# Patient Record
Sex: FEMALE | Race: WHITE | Employment: UNEMPLOYED | URBAN - METROPOLITAN AREA
[De-identification: names, ages, dates, MRNs, and addresses within clinical notes are randomized per-mention and may not be internally consistent; named-entity substitution may affect disease eponyms.]

---

## 2017-01-20 RX ORDER — SIMVASTATIN 20 MG
TABLET ORAL
Qty: 90 TABLET | Refills: 0 | Status: SHIPPED | OUTPATIENT
Start: 2017-01-20 | End: 2017-04-24 | Stop reason: SDUPTHER

## 2017-01-20 RX ORDER — LEVOTHYROXINE SODIUM 0.12 MG/1
TABLET ORAL
Qty: 90 TABLET | Refills: 0 | Status: SHIPPED | OUTPATIENT
Start: 2017-01-20 | End: 2017-04-24 | Stop reason: SDUPTHER

## 2017-04-24 DIAGNOSIS — E03.9 HYPOTHYROIDISM, UNSPECIFIED TYPE: ICD-10-CM

## 2017-04-24 DIAGNOSIS — E78.5 HYPERLIPIDEMIA, UNSPECIFIED HYPERLIPIDEMIA TYPE: Primary | ICD-10-CM

## 2017-04-24 DIAGNOSIS — I10 ESSENTIAL HYPERTENSION: ICD-10-CM

## 2017-04-27 RX ORDER — SIMVASTATIN 20 MG
TABLET ORAL
Qty: 90 TABLET | Refills: 0 | Status: SHIPPED | OUTPATIENT
Start: 2017-04-27 | End: 2017-07-30 | Stop reason: SDUPTHER

## 2017-04-27 RX ORDER — LEVOTHYROXINE SODIUM 0.12 MG/1
TABLET ORAL
Qty: 90 TABLET | Refills: 0 | Status: SHIPPED | OUTPATIENT
Start: 2017-04-27 | End: 2017-07-30 | Stop reason: SDUPTHER

## 2017-05-06 DIAGNOSIS — E78.5 HYPERLIPIDEMIA, UNSPECIFIED HYPERLIPIDEMIA TYPE: ICD-10-CM

## 2017-05-06 DIAGNOSIS — E03.9 HYPOTHYROIDISM, UNSPECIFIED TYPE: ICD-10-CM

## 2017-05-06 DIAGNOSIS — I10 ESSENTIAL HYPERTENSION: ICD-10-CM

## 2017-05-06 LAB
A/G RATIO: 1.6 (ref 1.1–2.2)
ALBUMIN SERPL-MCNC: 4.2 G/DL (ref 3.4–5)
ALP BLD-CCNC: 120 U/L (ref 40–129)
ALT SERPL-CCNC: 34 U/L (ref 10–40)
ANION GAP SERPL CALCULATED.3IONS-SCNC: 15 MMOL/L (ref 3–16)
AST SERPL-CCNC: 34 U/L (ref 15–37)
BILIRUB SERPL-MCNC: 1.3 MG/DL (ref 0–1)
BUN BLDV-MCNC: 9 MG/DL (ref 7–20)
CALCIUM SERPL-MCNC: 9.5 MG/DL (ref 8.3–10.6)
CHLORIDE BLD-SCNC: 100 MMOL/L (ref 99–110)
CHOLESTEROL, TOTAL: 180 MG/DL (ref 0–199)
CO2: 26 MMOL/L (ref 21–32)
CREAT SERPL-MCNC: <0.5 MG/DL (ref 0.6–1.1)
CREATININE URINE: 107.8 MG/DL (ref 28–259)
GFR AFRICAN AMERICAN: >60
GFR NON-AFRICAN AMERICAN: >60
GLOBULIN: 2.6 G/DL
GLUCOSE BLD-MCNC: 211 MG/DL (ref 70–99)
HDLC SERPL-MCNC: 36 MG/DL (ref 40–60)
LDL CHOLESTEROL CALCULATED: 93 MG/DL
MICROALBUMIN UR-MCNC: 3.5 MG/DL
MICROALBUMIN/CREAT UR-RTO: 32.5 MG/G (ref 0–30)
POTASSIUM SERPL-SCNC: 3.9 MMOL/L (ref 3.5–5.1)
SODIUM BLD-SCNC: 141 MMOL/L (ref 136–145)
TOTAL PROTEIN: 6.8 G/DL (ref 6.4–8.2)
TRIGL SERPL-MCNC: 256 MG/DL (ref 0–150)
TSH REFLEX: 2.88 UIU/ML (ref 0.27–4.2)
VLDLC SERPL CALC-MCNC: 51 MG/DL

## 2017-05-07 LAB
ESTIMATED AVERAGE GLUCOSE: 271.9 MG/DL
HBA1C MFR BLD: 11.1 %

## 2017-05-08 ENCOUNTER — OFFICE VISIT (OUTPATIENT)
Dept: FAMILY MEDICINE CLINIC | Age: 57
End: 2017-05-08

## 2017-05-08 VITALS
SYSTOLIC BLOOD PRESSURE: 128 MMHG | TEMPERATURE: 98.4 F | HEIGHT: 63 IN | DIASTOLIC BLOOD PRESSURE: 82 MMHG | BODY MASS INDEX: 38.98 KG/M2 | WEIGHT: 220 LBS | HEART RATE: 72 BPM

## 2017-05-08 DIAGNOSIS — Z11.59 NEED FOR HEPATITIS C SCREENING TEST: ICD-10-CM

## 2017-05-08 DIAGNOSIS — E03.9 ACQUIRED HYPOTHYROIDISM: ICD-10-CM

## 2017-05-08 DIAGNOSIS — E78.00 PURE HYPERCHOLESTEROLEMIA: ICD-10-CM

## 2017-05-08 DIAGNOSIS — Z11.4 SCREENING FOR HIV (HUMAN IMMUNODEFICIENCY VIRUS): ICD-10-CM

## 2017-05-08 DIAGNOSIS — R80.9 PROTEINURIA: ICD-10-CM

## 2017-05-08 PROCEDURE — 99214 OFFICE O/P EST MOD 30 MIN: CPT | Performed by: FAMILY MEDICINE

## 2017-05-08 ASSESSMENT — ENCOUNTER SYMPTOMS
EYE DISCHARGE: 0
BLOOD IN STOOL: 0
EYE PAIN: 0
ABDOMINAL PAIN: 0
DIARRHEA: 0
WHEEZING: 0
CONSTIPATION: 0
RHINORRHEA: 0
SINUS PRESSURE: 0
COLOR CHANGE: 0
EYE REDNESS: 0
NAUSEA: 0
CHEST TIGHTNESS: 0
COUGH: 0
VOMITING: 0
SHORTNESS OF BREATH: 0

## 2017-05-08 ASSESSMENT — PATIENT HEALTH QUESTIONNAIRE - PHQ9
SUM OF ALL RESPONSES TO PHQ QUESTIONS 1-9: 0
1. LITTLE INTEREST OR PLEASURE IN DOING THINGS: 0
SUM OF ALL RESPONSES TO PHQ9 QUESTIONS 1 & 2: 0
2. FEELING DOWN, DEPRESSED OR HOPELESS: 0

## 2017-05-10 PROBLEM — Z11.4 SCREENING FOR HIV (HUMAN IMMUNODEFICIENCY VIRUS): Status: ACTIVE | Noted: 2017-05-10

## 2017-05-10 PROBLEM — R80.9 PROTEINURIA: Status: ACTIVE | Noted: 2017-05-10

## 2017-05-10 PROBLEM — Z11.59 NEED FOR HEPATITIS C SCREENING TEST: Status: ACTIVE | Noted: 2017-05-10

## 2017-05-10 RX ORDER — LISINOPRIL 5 MG/1
5 TABLET ORAL DAILY
Qty: 90 TABLET | Refills: 1 | Status: SHIPPED | OUTPATIENT
Start: 2017-05-10 | End: 2017-05-18 | Stop reason: ALTCHOICE

## 2017-05-18 ENCOUNTER — TELEPHONE (OUTPATIENT)
Dept: FAMILY MEDICINE CLINIC | Age: 57
End: 2017-05-18

## 2017-05-18 RX ORDER — LOSARTAN POTASSIUM 25 MG/1
25 TABLET ORAL DAILY
Qty: 30 TABLET | Refills: 3 | Status: SHIPPED | OUTPATIENT
Start: 2017-05-18 | End: 2017-10-17 | Stop reason: SDUPTHER

## 2017-06-02 ENCOUNTER — TELEPHONE (OUTPATIENT)
Dept: FAMILY MEDICINE CLINIC | Age: 57
End: 2017-06-02

## 2017-06-08 ENCOUNTER — TELEPHONE (OUTPATIENT)
Dept: FAMILY MEDICINE CLINIC | Age: 57
End: 2017-06-08

## 2017-06-14 ENCOUNTER — TELEPHONE (OUTPATIENT)
Dept: FAMILY MEDICINE CLINIC | Age: 57
End: 2017-06-14

## 2017-07-10 RX ORDER — GLIPIZIDE 10 MG/1
TABLET, FILM COATED, EXTENDED RELEASE ORAL
Qty: 180 TABLET | Refills: 1 | Status: SHIPPED | OUTPATIENT
Start: 2017-07-10 | End: 2018-01-20 | Stop reason: SDUPTHER

## 2017-07-31 RX ORDER — LEVOTHYROXINE SODIUM 0.12 MG/1
TABLET ORAL
Qty: 90 TABLET | Refills: 0 | Status: SHIPPED | OUTPATIENT
Start: 2017-07-31 | End: 2017-11-03 | Stop reason: SDUPTHER

## 2017-07-31 RX ORDER — SIMVASTATIN 20 MG
TABLET ORAL
Qty: 90 TABLET | Refills: 0 | Status: SHIPPED | OUTPATIENT
Start: 2017-07-31 | End: 2017-11-03 | Stop reason: SDUPTHER

## 2017-08-01 ENCOUNTER — CARE COORDINATION (OUTPATIENT)
Dept: CARE COORDINATION | Age: 57
End: 2017-08-01

## 2017-08-17 ENCOUNTER — OFFICE VISIT (OUTPATIENT)
Dept: FAMILY MEDICINE CLINIC | Age: 57
End: 2017-08-17

## 2017-08-17 VITALS
HEIGHT: 63 IN | WEIGHT: 217.8 LBS | SYSTOLIC BLOOD PRESSURE: 108 MMHG | BODY MASS INDEX: 38.59 KG/M2 | DIASTOLIC BLOOD PRESSURE: 78 MMHG | HEART RATE: 76 BPM | TEMPERATURE: 97.7 F

## 2017-08-17 DIAGNOSIS — Z11.4 SCREENING FOR HIV (HUMAN IMMUNODEFICIENCY VIRUS): ICD-10-CM

## 2017-08-17 DIAGNOSIS — R10.13 EPIGASTRIC PAIN: ICD-10-CM

## 2017-08-17 DIAGNOSIS — Z11.59 NEED FOR HEPATITIS C SCREENING TEST: ICD-10-CM

## 2017-08-17 DIAGNOSIS — R10.13 EPIGASTRIC PAIN: Primary | ICD-10-CM

## 2017-08-17 LAB
A/G RATIO: 1.7 (ref 1.1–2.2)
ALBUMIN SERPL-MCNC: 4.2 G/DL (ref 3.4–5)
ALP BLD-CCNC: 104 U/L (ref 40–129)
ALT SERPL-CCNC: 33 U/L (ref 10–40)
AMYLASE: 54 U/L (ref 25–115)
ANION GAP SERPL CALCULATED.3IONS-SCNC: 19 MMOL/L (ref 3–16)
AST SERPL-CCNC: 31 U/L (ref 15–37)
BASOPHILS ABSOLUTE: 0 K/UL (ref 0–0.2)
BASOPHILS RELATIVE PERCENT: 0.3 %
BILIRUB SERPL-MCNC: 1.1 MG/DL (ref 0–1)
BILIRUBIN URINE: NEGATIVE
BLOOD, URINE: NEGATIVE
BUN BLDV-MCNC: 9 MG/DL (ref 7–20)
CALCIUM SERPL-MCNC: 9.4 MG/DL (ref 8.3–10.6)
CHLORIDE BLD-SCNC: 99 MMOL/L (ref 99–110)
CLARITY: CLEAR
CO2: 22 MMOL/L (ref 21–32)
COLOR: YELLOW
CREAT SERPL-MCNC: 0.5 MG/DL (ref 0.6–1.1)
EOSINOPHILS ABSOLUTE: 0.2 K/UL (ref 0–0.6)
EOSINOPHILS RELATIVE PERCENT: 2.5 %
GFR AFRICAN AMERICAN: >60
GFR NON-AFRICAN AMERICAN: >60
GLOBULIN: 2.5 G/DL
GLUCOSE BLD-MCNC: 177 MG/DL (ref 70–99)
GLUCOSE URINE: NEGATIVE MG/DL
HCT VFR BLD CALC: 39.6 % (ref 36–48)
HEMOGLOBIN: 13.4 G/DL (ref 12–16)
HEPATITIS C ANTIBODY INTERPRETATION: NORMAL
KETONES, URINE: NEGATIVE MG/DL
LEUKOCYTE ESTERASE, URINE: NEGATIVE
LIPASE: 67 U/L (ref 13–60)
LYMPHOCYTES ABSOLUTE: 3.3 K/UL (ref 1–5.1)
LYMPHOCYTES RELATIVE PERCENT: 36.2 %
MCH RBC QN AUTO: 30 PG (ref 26–34)
MCHC RBC AUTO-ENTMCNC: 33.8 G/DL (ref 31–36)
MCV RBC AUTO: 88.7 FL (ref 80–100)
MICROSCOPIC EXAMINATION: NORMAL
MONOCYTES ABSOLUTE: 0.6 K/UL (ref 0–1.3)
MONOCYTES RELATIVE PERCENT: 6.3 %
NEUTROPHILS ABSOLUTE: 5 K/UL (ref 1.7–7.7)
NEUTROPHILS RELATIVE PERCENT: 54.7 %
NITRITE, URINE: NEGATIVE
PDW BLD-RTO: 13.7 % (ref 12.4–15.4)
PH UA: 5.5
PLATELET # BLD: 213 K/UL (ref 135–450)
PMV BLD AUTO: 8.9 FL (ref 5–10.5)
POTASSIUM SERPL-SCNC: 3.9 MMOL/L (ref 3.5–5.1)
PROTEIN UA: NEGATIVE MG/DL
RBC # BLD: 4.47 M/UL (ref 4–5.2)
SODIUM BLD-SCNC: 140 MMOL/L (ref 136–145)
SPECIFIC GRAVITY UA: 1.02
TOTAL PROTEIN: 6.7 G/DL (ref 6.4–8.2)
URINE TYPE: NORMAL
UROBILINOGEN, URINE: 1 E.U./DL
WBC # BLD: 9.2 K/UL (ref 4–11)

## 2017-08-17 PROCEDURE — 99214 OFFICE O/P EST MOD 30 MIN: CPT | Performed by: FAMILY MEDICINE

## 2017-08-17 ASSESSMENT — ENCOUNTER SYMPTOMS
EYE PAIN: 0
SINUS PRESSURE: 0
BLOOD IN STOOL: 0
COUGH: 0
EYE REDNESS: 0
ABDOMINAL PAIN: 1
DIARRHEA: 0
RHINORRHEA: 0
CONSTIPATION: 0
WHEEZING: 0
SHORTNESS OF BREATH: 0
VOMITING: 0
EYE DISCHARGE: 0
CHEST TIGHTNESS: 0

## 2017-08-18 LAB
ESTIMATED AVERAGE GLUCOSE: 194.4 MG/DL
HBA1C MFR BLD: 8.4 %
HIV-1 AND HIV-2 ANTIBODIES: NORMAL

## 2017-08-21 ASSESSMENT — ENCOUNTER SYMPTOMS
COLOR CHANGE: 0
NAUSEA: 0

## 2017-08-28 ENCOUNTER — OFFICE VISIT (OUTPATIENT)
Dept: FAMILY MEDICINE CLINIC | Age: 57
End: 2017-08-28

## 2017-08-28 VITALS
HEART RATE: 76 BPM | BODY MASS INDEX: 38.48 KG/M2 | TEMPERATURE: 97.8 F | SYSTOLIC BLOOD PRESSURE: 112 MMHG | DIASTOLIC BLOOD PRESSURE: 80 MMHG | WEIGHT: 217.2 LBS | HEIGHT: 63 IN

## 2017-08-28 DIAGNOSIS — E03.4 HYPOTHYROIDISM DUE TO ACQUIRED ATROPHY OF THYROID: ICD-10-CM

## 2017-08-28 DIAGNOSIS — E78.00 PURE HYPERCHOLESTEROLEMIA: ICD-10-CM

## 2017-08-28 PROCEDURE — 99213 OFFICE O/P EST LOW 20 MIN: CPT | Performed by: FAMILY MEDICINE

## 2017-08-28 ASSESSMENT — ENCOUNTER SYMPTOMS
SHORTNESS OF BREATH: 0
DIARRHEA: 0
EYE PAIN: 0
ABDOMINAL PAIN: 0
BLOOD IN STOOL: 0
SINUS PRESSURE: 0
NAUSEA: 0
WHEEZING: 0
COUGH: 0
CHEST TIGHTNESS: 0
EYE REDNESS: 0
EYE DISCHARGE: 0
VOMITING: 0
RHINORRHEA: 0
COLOR CHANGE: 0
CONSTIPATION: 0

## 2017-10-17 RX ORDER — LOSARTAN POTASSIUM 25 MG/1
TABLET ORAL
Qty: 30 TABLET | Refills: 2 | Status: SHIPPED | OUTPATIENT
Start: 2017-10-17 | End: 2018-03-06 | Stop reason: SDUPTHER

## 2017-11-04 RX ORDER — SIMVASTATIN 20 MG
TABLET ORAL
Qty: 90 TABLET | Refills: 0 | Status: SHIPPED | OUTPATIENT
Start: 2017-11-04 | End: 2018-01-20 | Stop reason: SDUPTHER

## 2017-11-04 RX ORDER — LEVOTHYROXINE SODIUM 0.12 MG/1
TABLET ORAL
Qty: 90 TABLET | Refills: 0 | Status: SHIPPED | OUTPATIENT
Start: 2017-11-04 | End: 2018-01-20 | Stop reason: SDUPTHER

## 2018-01-18 ENCOUNTER — OFFICE VISIT (OUTPATIENT)
Dept: FAMILY MEDICINE CLINIC | Age: 58
End: 2018-01-18

## 2018-01-18 ENCOUNTER — TELEPHONE (OUTPATIENT)
Dept: FAMILY MEDICINE CLINIC | Age: 58
End: 2018-01-18

## 2018-01-18 VITALS
HEIGHT: 63 IN | OXYGEN SATURATION: 96 % | HEART RATE: 88 BPM | SYSTOLIC BLOOD PRESSURE: 136 MMHG | DIASTOLIC BLOOD PRESSURE: 80 MMHG | TEMPERATURE: 97.7 F | BODY MASS INDEX: 39.16 KG/M2 | WEIGHT: 221 LBS

## 2018-01-18 DIAGNOSIS — R35.0 URINARY FREQUENCY: ICD-10-CM

## 2018-01-18 DIAGNOSIS — R73.09 ELEVATED GLUCOSE LEVEL: ICD-10-CM

## 2018-01-18 LAB
BILIRUBIN, POC: NORMAL
BLOOD URINE, POC: NORMAL
CLARITY, POC: NORMAL
COLOR, POC: NORMAL
GLUCOSE BLD-MCNC: 228 MG/DL
GLUCOSE URINE, POC: NORMAL
HBA1C MFR BLD: 10 %
KETONES, POC: NORMAL
LEUKOCYTE EST, POC: NORMAL
NITRITE, POC: NORMAL
PH, POC: 5
PROTEIN, POC: NORMAL
SPECIFIC GRAVITY, POC: 1.03
UROBILINOGEN, POC: 0.2

## 2018-01-18 PROCEDURE — G8427 DOCREV CUR MEDS BY ELIG CLIN: HCPCS | Performed by: PHYSICIAN ASSISTANT

## 2018-01-18 PROCEDURE — 3046F HEMOGLOBIN A1C LEVEL >9.0%: CPT | Performed by: PHYSICIAN ASSISTANT

## 2018-01-18 PROCEDURE — 83036 HEMOGLOBIN GLYCOSYLATED A1C: CPT | Performed by: PHYSICIAN ASSISTANT

## 2018-01-18 PROCEDURE — 81002 URINALYSIS NONAUTO W/O SCOPE: CPT | Performed by: PHYSICIAN ASSISTANT

## 2018-01-18 PROCEDURE — 99214 OFFICE O/P EST MOD 30 MIN: CPT | Performed by: PHYSICIAN ASSISTANT

## 2018-01-18 PROCEDURE — 3017F COLORECTAL CA SCREEN DOC REV: CPT | Performed by: PHYSICIAN ASSISTANT

## 2018-01-18 PROCEDURE — G8417 CALC BMI ABV UP PARAM F/U: HCPCS | Performed by: PHYSICIAN ASSISTANT

## 2018-01-18 PROCEDURE — 1036F TOBACCO NON-USER: CPT | Performed by: PHYSICIAN ASSISTANT

## 2018-01-18 PROCEDURE — 82962 GLUCOSE BLOOD TEST: CPT | Performed by: PHYSICIAN ASSISTANT

## 2018-01-18 PROCEDURE — 3014F SCREEN MAMMO DOC REV: CPT | Performed by: PHYSICIAN ASSISTANT

## 2018-01-18 PROCEDURE — G8484 FLU IMMUNIZE NO ADMIN: HCPCS | Performed by: PHYSICIAN ASSISTANT

## 2018-01-18 NOTE — TELEPHONE ENCOUNTER
I see she saw the physician assistant today at my new office as I was not in. Is she continuing to see me in [facility]?  I see she has appt with New doctor beginning feb- Dr. Zaheer Figueroa on feb 5th  If so then cancel her feb 15th with me in [facility]

## 2018-01-18 NOTE — PROGRESS NOTES
Esomeprazole Magnesium (NEXIUM PO) Take by mouth daily       No current facility-administered medications for this visit. PHYSICAL EXAM     Vitals:    01/18/18 1110   BP: 136/80   Site: Left Arm   Position: Sitting   Cuff Size: Medium Adult   Pulse: 88   Temp: 97.7 °F (36.5 °C)   TempSrc: Temporal   SpO2: 96%   Weight: 221 lb (100.2 kg)   Height: 5' 3\" (1.6 m)     APPEARANCE: Pleasant, friendly, well-nourished. No acute distress. HEENT: Normocephalic, atraumatic. EOMI,PERRLA. Conjunctiva pink and moist.  Sclera white. Ears: External ears uniform. Hearing intact. Nose: No septal deviation. Mouth: Oral mucosa moist. Throat is without erythema, exudates, edema. NECK: No lymphadenopathy or masses. HEART: Reg rate and rhythm. No murmurs, rubs, or gallops. LUNGS: Clear to auscultation. No wheezes, rales, or rhonchi. ABDOMEN:  Soft, non-tender. Normal active bowel sounds. No guarding. No hepatosplenomegaly  MUSCULOSKELETOL:  No new deformity. No clubbing, cyanosis or edema. NEUROLOGIC: Normal gross and sensory exam.  SKIN: Warm, dry, normal turgor. Cap refill <3secs. No rashes, petechiae, purpura. ADDITIONAL STUDIES     Pertinent laboratory results and/or imaging reviewed. Orders Placed This Encounter   Procedures    Ambulatory referral to Diabetic Education=at [facility] ( close to home)    POCT glycosylated hemoglobin (Hb A1C)=10.0    POCT Glucose=228    POCT Urinalysis no Micro= SG>1.030 with glucosuria, and trace ketones. Negative for UTI. IMPRESSION/ PLAN  1. Uncontrolled type 2 diabetes mellitus without complication, without long-term current use of insulin (HCC)    2. Elevated glucose level    3. Urinary frequency      Uncontrolled diabetes therefore will add Victoza so while strongly encouraging dietitian assistance. She was instructed on use of Victoza and states her father is on it and has seen him use it. Discussed results obtained today.  Diagnosis and instructions discussed in detail. ( Total time spent with Jovan Card was 60 minutes. Greater than 30  Minutes I.e. Greater than 50% of this time was spent counseling and coordinating the care of this patient.)        Patient Instructions   Add Victoza to your medication regimen. Begin taking blood sugars daily and record. Appointment made for diabetic diet training to be done at [facility].  Increase water intake. Avoid all sweets. Decrease fatty foods. Follow-up in one month with Dr. Junaid Silva. Fasting labs will be performed at follow-up. Orders Placed This Encounter   Medications    Liraglutide (VICTOZA) 18 MG/3ML SOPN SC injection     Sig: Inject 1.2 mg into the skin daily Start at 0.6mg SC qd x 1 wk, then 1.2mg SC qd; Max 1.8mg SC q day.      Dispense:  6 mL     Refill:  0

## 2018-01-22 RX ORDER — GLIPIZIDE 10 MG/1
TABLET, FILM COATED, EXTENDED RELEASE ORAL
Qty: 180 TABLET | Refills: 0 | Status: SHIPPED | OUTPATIENT
Start: 2018-01-22 | End: 2018-05-03 | Stop reason: SDUPTHER

## 2018-01-22 RX ORDER — SIMVASTATIN 20 MG
TABLET ORAL
Qty: 90 TABLET | Refills: 0 | Status: SHIPPED | OUTPATIENT
Start: 2018-01-22 | End: 2018-05-03 | Stop reason: SDUPTHER

## 2018-01-22 RX ORDER — LEVOTHYROXINE SODIUM 0.12 MG/1
TABLET ORAL
Qty: 90 TABLET | Refills: 0 | Status: SHIPPED | OUTPATIENT
Start: 2018-01-22 | End: 2018-05-03 | Stop reason: SDUPTHER

## 2018-01-22 RX ORDER — SITAGLIPTIN 100 MG/1
TABLET, FILM COATED ORAL
Qty: 90 TABLET | Refills: 0 | Status: SHIPPED | OUTPATIENT
Start: 2018-01-22 | End: 2018-07-31

## 2018-02-05 ENCOUNTER — OFFICE VISIT (OUTPATIENT)
Dept: FAMILY MEDICINE CLINIC | Age: 58
End: 2018-02-05

## 2018-02-05 VITALS
BODY MASS INDEX: 38.09 KG/M2 | OXYGEN SATURATION: 95 % | WEIGHT: 215 LBS | HEIGHT: 63 IN | RESPIRATION RATE: 16 BRPM | DIASTOLIC BLOOD PRESSURE: 70 MMHG | SYSTOLIC BLOOD PRESSURE: 112 MMHG | HEART RATE: 95 BPM

## 2018-02-05 DIAGNOSIS — I72.8 SPLENIC ARTERY ANEURYSM (HCC): ICD-10-CM

## 2018-02-05 DIAGNOSIS — E78.00 PURE HYPERCHOLESTEROLEMIA: ICD-10-CM

## 2018-02-05 DIAGNOSIS — R80.9 PROTEINURIA, UNSPECIFIED TYPE: ICD-10-CM

## 2018-02-05 DIAGNOSIS — E03.4 HYPOTHYROIDISM DUE TO ACQUIRED ATROPHY OF THYROID: ICD-10-CM

## 2018-02-05 PROBLEM — Z11.59 NEED FOR HEPATITIS C SCREENING TEST: Status: RESOLVED | Noted: 2017-05-10 | Resolved: 2018-02-05

## 2018-02-05 PROBLEM — Z11.4 SCREENING FOR HIV (HUMAN IMMUNODEFICIENCY VIRUS): Status: RESOLVED | Noted: 2017-05-10 | Resolved: 2018-02-05

## 2018-02-05 PROCEDURE — 3046F HEMOGLOBIN A1C LEVEL >9.0%: CPT | Performed by: INTERNAL MEDICINE

## 2018-02-05 PROCEDURE — 3017F COLORECTAL CA SCREEN DOC REV: CPT | Performed by: INTERNAL MEDICINE

## 2018-02-05 PROCEDURE — G8427 DOCREV CUR MEDS BY ELIG CLIN: HCPCS | Performed by: INTERNAL MEDICINE

## 2018-02-05 PROCEDURE — G8484 FLU IMMUNIZE NO ADMIN: HCPCS | Performed by: INTERNAL MEDICINE

## 2018-02-05 PROCEDURE — 1036F TOBACCO NON-USER: CPT | Performed by: INTERNAL MEDICINE

## 2018-02-05 PROCEDURE — 99213 OFFICE O/P EST LOW 20 MIN: CPT | Performed by: INTERNAL MEDICINE

## 2018-02-05 PROCEDURE — G8417 CALC BMI ABV UP PARAM F/U: HCPCS | Performed by: INTERNAL MEDICINE

## 2018-02-05 PROCEDURE — 3014F SCREEN MAMMO DOC REV: CPT | Performed by: INTERNAL MEDICINE

## 2018-02-05 NOTE — PROGRESS NOTES
HPI: Estrella Encarnacion presents to establish care and show sugars. Chronic health issues include tobacco cessation, diabetes, splenic aneurysm, obesity. DM2 x 10 years. a1c 10 . Elevated triglycerides. + protein in urine. No neuropathy. No current exercise. Positive tinea. Started victoza with improvement of sugars and 6 pound weight loss. Is going to start exercising again. Eats quite a bit of fruit. Aware vegetables her better. Does not go barefoot. Denies any neuropathy or retinopathy. Positive proteinuria. Taking metformin, glipizide, Januvia as well. Has not scheduled with clinical pharmacist. Some issues with cost of medication. Declines flu vaccine        no abnormal pap. No STD concerns. No fh ovarian or breast cancer in family pap  [place]. utd with mammograms. Tubal pregnancy. No domestic violence. . CT of the abdomen with splenic aneurysm. Recheck due in 10 of 2018. Colonoscopy due May 2018. PMH:    Cholecystectomy    Childbirth    Hx tubal pregnancy laparotomy     SH:  daughter 12. . Tobacco cessation 60 packyears discontinued 20 yr ago. FH: 2 sisters 1 brother    + DM,MGF suicide, dementia in 66's     - colon, breast, ovarina cancer. Review of systems: Remote concussion. Cataract. No chronic sinus symptoms. Denies any wheezing pneumonias or abnormal chest x-rays. Denies emphysema. No chest pain palpitations or syncope. No breast masses. Denies any GE reflux or bloody stools. No kidney stones or recurrent bladder infections. Rare knee pain. Tinea of the toes.         12 point ROS reviewed and negative other than mentioned above  Allergies   Allergen Reactions    Lisinopril      Cough/scratchy throat       Outpatient Prescriptions Marked as Taking for the 18 encounter (Office Visit) with Robin Mortensen MD   Medication Sig Dispense Refill    JANUVIA 100 MG tablet TAKE ONE TABLET BY MOUTH EVERY MORNING 90 tablet 0   

## 2018-02-05 NOTE — PATIENT INSTRUCTIONS
buy without a prescription (such as corn removers) can be harmful. · Always get early treatment for foot problems. A minor irritation can lead to a major problem if not properly cared for early. When should you call for help? Call your doctor now or seek immediate medical care if:  ? · You have a foot sore, an ulcer or break in the skin that is not healing after 4 days, bleeding corns or calluses, or an ingrown toenail. ? · You have blue or black areas, which can mean bruising or blood flow problems. ? · You have peeling skin or tiny blisters between your toes or cracking or oozing of the skin. ? · You have a fever for more than 24 hours and a foot sore. ? · You have new numbness or tingling in your feet that does not go away after you move your feet or change positions. ? · You have unexplained or unusual swelling of the foot or ankle. ? Watch closely for changes in your health, and be sure to contact your doctor if:  ? · You cannot do proper foot care. Where can you learn more? Go to https://ClickablepeInfracommerce.TapFame. org and sign in to your [employer]ub account. Enter A739 in the [employer] box to learn more about \"Diabetes Foot Health: Care Instructions. \"     If you do not have an account, please click on the \"Sign Up Now\" link. Current as of: March 13, 2017  Content Version: 11.5  © 8984-3599 [employer]. Care instructions adapted under license by [facility] ([facility]). If you have questions about a medical condition or this instruction, always ask your healthcare professional. [place] [address] any warranty or liability for your use of this information. Patient Education        Learning About Diabetes Food Guidelines  Your Care Instructions    Meal planning is important to manage diabetes. It helps keep your blood sugar at a target level (which you set with your doctor). You don't have to eat special foods.  You can eat what your family eats, including diabetes medicines. Follow-up care is a key part of your treatment and safety. Be sure to make and go to all appointments, and call your doctor if you are having problems. It's also a good idea to know your test results and keep a list of the medicines you take. Where can you learn more? Go to https://chpepiceweb.iPrism Global. org and sign in to your [employer] account. Enter P900 in the [employer] box to learn more about \"Learning About Diabetes Food Guidelines. \"     If you do not have an account, please click on the \"Sign Up Now\" link. Current as of: March 13, 2017  Content Version: 11.5  © 7564-9731 Healthwise, Incorporated. Care instructions adapted under license by [facility] ([facility]). If you have questions about a medical condition or this instruction, always ask your healthcare professional. [address] any warranty or liability for your use of this information.

## 2018-02-09 ENCOUNTER — OFFICE VISIT (OUTPATIENT)
Dept: FAMILY MEDICINE CLINIC | Age: 58
End: 2018-02-09

## 2018-02-09 VITALS
RESPIRATION RATE: 16 BRPM | WEIGHT: 212 LBS | DIASTOLIC BLOOD PRESSURE: 84 MMHG | HEIGHT: 63 IN | HEART RATE: 68 BPM | SYSTOLIC BLOOD PRESSURE: 125 MMHG | BODY MASS INDEX: 37.56 KG/M2

## 2018-02-09 DIAGNOSIS — R51.9 ACUTE NONINTRACTABLE HEADACHE, UNSPECIFIED HEADACHE TYPE: Primary | ICD-10-CM

## 2018-02-09 PROCEDURE — 99213 OFFICE O/P EST LOW 20 MIN: CPT | Performed by: FAMILY MEDICINE

## 2018-02-09 PROCEDURE — G8484 FLU IMMUNIZE NO ADMIN: HCPCS | Performed by: FAMILY MEDICINE

## 2018-02-09 PROCEDURE — 3017F COLORECTAL CA SCREEN DOC REV: CPT | Performed by: FAMILY MEDICINE

## 2018-02-09 PROCEDURE — 1036F TOBACCO NON-USER: CPT | Performed by: FAMILY MEDICINE

## 2018-02-09 PROCEDURE — G8427 DOCREV CUR MEDS BY ELIG CLIN: HCPCS | Performed by: FAMILY MEDICINE

## 2018-02-09 PROCEDURE — G8417 CALC BMI ABV UP PARAM F/U: HCPCS | Performed by: FAMILY MEDICINE

## 2018-02-09 PROCEDURE — 3014F SCREEN MAMMO DOC REV: CPT | Performed by: FAMILY MEDICINE

## 2018-02-09 RX ORDER — AZITHROMYCIN 250 MG/1
TABLET, FILM COATED ORAL
Qty: 1 PACKET | Refills: 0 | Status: SHIPPED | OUTPATIENT
Start: 2018-02-09 | End: 2018-03-14

## 2018-03-06 ENCOUNTER — TELEPHONE (OUTPATIENT)
Dept: FAMILY MEDICINE CLINIC | Age: 58
End: 2018-03-06

## 2018-03-06 RX ORDER — LOSARTAN POTASSIUM 25 MG/1
TABLET ORAL
Qty: 990 TABLET | Refills: 3 | Status: SHIPPED | OUTPATIENT
Start: 2018-03-06 | End: 2019-01-14 | Stop reason: SDUPTHER

## 2018-03-15 ENCOUNTER — OFFICE VISIT (OUTPATIENT)
Dept: FAMILY MEDICINE CLINIC | Age: 58
End: 2018-03-15

## 2018-03-15 VITALS
OXYGEN SATURATION: 97 % | DIASTOLIC BLOOD PRESSURE: 62 MMHG | WEIGHT: 213.25 LBS | HEIGHT: 63 IN | BODY MASS INDEX: 37.79 KG/M2 | HEART RATE: 92 BPM | SYSTOLIC BLOOD PRESSURE: 118 MMHG

## 2018-03-15 DIAGNOSIS — R80.9 PROTEINURIA, UNSPECIFIED TYPE: ICD-10-CM

## 2018-03-15 DIAGNOSIS — H81.09 MENIERE'S DISEASE, UNSPECIFIED LATERALITY: ICD-10-CM

## 2018-03-15 DIAGNOSIS — E78.00 PURE HYPERCHOLESTEROLEMIA: ICD-10-CM

## 2018-03-15 DIAGNOSIS — I72.8 SPLENIC ARTERY ANEURYSM (HCC): ICD-10-CM

## 2018-03-15 DIAGNOSIS — E03.4 HYPOTHYROIDISM DUE TO ACQUIRED ATROPHY OF THYROID: ICD-10-CM

## 2018-03-15 LAB — HBA1C MFR BLD: 7.6 %

## 2018-03-15 PROCEDURE — G8417 CALC BMI ABV UP PARAM F/U: HCPCS | Performed by: INTERNAL MEDICINE

## 2018-03-15 PROCEDURE — 3014F SCREEN MAMMO DOC REV: CPT | Performed by: INTERNAL MEDICINE

## 2018-03-15 PROCEDURE — 3017F COLORECTAL CA SCREEN DOC REV: CPT | Performed by: INTERNAL MEDICINE

## 2018-03-15 PROCEDURE — 1036F TOBACCO NON-USER: CPT | Performed by: INTERNAL MEDICINE

## 2018-03-15 PROCEDURE — 83036 HEMOGLOBIN GLYCOSYLATED A1C: CPT | Performed by: INTERNAL MEDICINE

## 2018-03-15 PROCEDURE — G8427 DOCREV CUR MEDS BY ELIG CLIN: HCPCS | Performed by: INTERNAL MEDICINE

## 2018-03-15 PROCEDURE — 3045F PR MOST RECENT HEMOGLOBIN A1C LEVEL 7.0-9.0%: CPT | Performed by: INTERNAL MEDICINE

## 2018-03-15 PROCEDURE — 99213 OFFICE O/P EST LOW 20 MIN: CPT | Performed by: INTERNAL MEDICINE

## 2018-03-15 PROCEDURE — G8484 FLU IMMUNIZE NO ADMIN: HCPCS | Performed by: INTERNAL MEDICINE

## 2018-03-15 NOTE — PATIENT INSTRUCTIONS
good, quick way to make sure that you have a balanced meal. It also helps you spread carbs throughout the day. ¨ Divide your plate by types of foods. Put non-starchy vegetables on half the plate, meat or other protein food on one-quarter of the plate, and a grain or starchy vegetable in the final quarter of the plate. To this you can add a small piece of fruit and 1 cup of milk or yogurt, depending on how many carbs you are supposed to eat at a meal.  · Try to eat about the same amount of carbs at each meal. Do not \"save up\" your daily allowance of carbs to eat at one meal.  · Proteins have very little or no carbs per serving. Examples of proteins are beef, chicken, turkey, fish, eggs, tofu, cheese, cottage cheese, and peanut butter. A serving size of meat is 3 ounces, which is about the size of a deck of cards. Examples of meat substitute serving sizes (equal to 1 ounce of meat) are 1/4 cup of cottage cheese, 1 egg, 1 tablespoon of peanut butter, and ½ cup of tofu. How can you eat out and still eat healthy? · Learn to estimate the serving sizes of foods that have carbohydrate. If you measure food at home, it will be easier to estimate the amount in a serving of restaurant food. · If the meal you order has too much carbohydrate (such as potatoes, corn, or baked beans), ask to have a low-carbohydrate food instead. Ask for a salad or green vegetables. · If you use insulin, check your blood sugar before and after eating out to help you plan how much to eat in the future. · If you eat more carbohydrate at a meal than you had planned, take a walk or do other exercise. This will help lower your blood sugar. What else should you know? · Limit saturated fat, such as the fat from meat and dairy products. This is a healthy choice because people who have diabetes are at higher risk of heart disease. So choose lean cuts of meat and nonfat or low-fat dairy products.  Use olive or canola oil instead of butter or shortening when cooking. · Don't skip meals. Your blood sugar may drop too low if you skip meals and take insulin or certain medicines for diabetes. · Check with your doctor before you drink alcohol. Alcohol can cause your blood sugar to drop too low. Alcohol can also cause a bad reaction if you take certain diabetes medicines. Follow-up care is a key part of your treatment and safety. Be sure to make and go to all appointments, and call your doctor if you are having problems. It's also a good idea to know your test results and keep a list of the medicines you take. Where can you learn more? Go to https://GridCOM Technologiespepiceweb.iBuildApp. org and sign in to your Healthcare IT account. Enter V919 in the [employer] box to learn more about \"Learning About Diabetes Food Guidelines. \"     If you do not have an account, please click on the \"Sign Up Now\" link. Current as of: March 13, 2017  Content Version: 11.5  © 6969-4130 Healthwise, [employer]. Care instructions adapted under license by [facility] ([facility]). If you have questions about a medical condition or this instruction, always ask your healthcare professional. [place] [address] any warranty or liability for your use of this information. NICE JOB!     See you late May fasting  Continue exercise  I cannot order 1.8 and will check to see what the problem with this is    Look on line for discounts

## 2018-03-15 NOTE — PROGRESS NOTES
tablet TAKE ONE TABLET BY MOUTH TWICE A  tablet 0    simvastatin (ZOCOR) 20 MG tablet TAKE ONE TABLET BY MOUTH EVERY NIGHT AT BEDTIME 90 tablet 0    metFORMIN (GLUCOPHAGE) 1000 MG tablet TAKE 1 TABLET BY MOUTH TWICE A DAY WITH MEALS 180 tablet 1    vitamin D (CHOLECALCIFEROL) 1000 UNIT TABS tablet Take 1,000 Units by mouth daily      Esomeprazole Magnesium (NEXIUM PO) Take by mouth daily               Past Medical History:   Diagnosis Date    GERD (gastroesophageal reflux disease)     Hyperlipidemia     Hypothyroidism     Type II or unspecified type diabetes mellitus without mention of complication, not stated as uncontrolled        Past Surgical History:   Procedure Laterality Date     SECTION      x 4    CHOLECYSTECTOMY           Social History     Social History    Marital status:      Spouse name: N/A    Number of children: N/A    Years of education: N/A     Occupational History    Not on file. Social History Main Topics    Smoking status: Former Smoker     Years: 60.00    Smokeless tobacco: Former User    Alcohol use Yes      Comment: rare    Drug use: No    Sexual activity: Not on file     Other Topics Concern    Not on file     Social History Narrative    No narrative on file       Family History   Problem Relation Age of Onset    Diabetes Father     High Blood Pressure Father     Heart Attack Paternal Grandfather            Review of Systems          Objective     /62 (Site: Left Arm, Position: Sitting, Cuff Size: Large Adult)   Pulse 92   Ht 5' 3\" (1.6 m)   Wt 213 lb 4 oz (96.7 kg)   LMP 2013   SpO2 97%   BMI 37.78 kg/m²     @LASTSAO2(3)@    Wt Readings from Last 3 Encounters:   03/15/18 213 lb 4 oz (96.7 kg)   18 212 lb (96.2 kg)   18 215 lb (97.5 kg)       Physical Exam     NAD alert and cooperative  HEENT:Moist mucous membranes. Marginal dentition. Good upstroke of the carotids. Positive alopecia. Lungs are clear.  Good I:E

## 2018-04-13 ENCOUNTER — TELEPHONE (OUTPATIENT)
Dept: FAMILY MEDICINE CLINIC | Age: 58
End: 2018-04-13

## 2018-05-01 ENCOUNTER — TELEPHONE (OUTPATIENT)
Dept: FAMILY MEDICINE CLINIC | Age: 58
End: 2018-05-01

## 2018-05-03 DIAGNOSIS — E78.00 PURE HYPERCHOLESTEROLEMIA: Primary | ICD-10-CM

## 2018-05-03 DIAGNOSIS — E03.4 HYPOTHYROIDISM DUE TO ACQUIRED ATROPHY OF THYROID: ICD-10-CM

## 2018-05-03 RX ORDER — LEVOTHYROXINE SODIUM 0.12 MG/1
TABLET ORAL
Qty: 90 TABLET | Refills: 1 | Status: SHIPPED | OUTPATIENT
Start: 2018-05-03 | End: 2018-10-28 | Stop reason: SDUPTHER

## 2018-05-03 RX ORDER — SIMVASTATIN 20 MG
TABLET ORAL
Qty: 90 TABLET | Refills: 1 | Status: SHIPPED | OUTPATIENT
Start: 2018-05-03 | End: 2018-10-28 | Stop reason: SDUPTHER

## 2018-05-03 RX ORDER — GLIPIZIDE 10 MG/1
TABLET, FILM COATED, EXTENDED RELEASE ORAL
Qty: 180 TABLET | Refills: 0 | Status: SHIPPED | OUTPATIENT
Start: 2018-05-03 | End: 2018-08-01 | Stop reason: SDUPTHER

## 2018-05-03 NOTE — LETTER
Bill Guti[address] [id number]  Phone: [phone]  Fax: [phone]    Chikis Cadena, [employer]        May 8, 2018    [address]  [id number] [address]      Dear Yolanda Perez:    Gregg Rendon are due for a 3 month diabetic follow up in June. Please call to schedule this at your soonest convenience to avoid any interruption in your medication. If you have any questions or concerns, please don't hesitate to call.     Sincerely,        Chikis Cadena MA

## 2018-07-31 ENCOUNTER — OFFICE VISIT (OUTPATIENT)
Dept: FAMILY MEDICINE CLINIC | Age: 58
End: 2018-07-31

## 2018-07-31 VITALS
HEART RATE: 90 BPM | DIASTOLIC BLOOD PRESSURE: 60 MMHG | SYSTOLIC BLOOD PRESSURE: 110 MMHG | WEIGHT: 214 LBS | BODY MASS INDEX: 37.91 KG/M2 | OXYGEN SATURATION: 97 %

## 2018-07-31 DIAGNOSIS — R07.9 CHEST PAIN, UNSPECIFIED TYPE: ICD-10-CM

## 2018-07-31 DIAGNOSIS — M25.511 BILATERAL SHOULDER PAIN, UNSPECIFIED CHRONICITY: ICD-10-CM

## 2018-07-31 DIAGNOSIS — E03.4 HYPOTHYROIDISM DUE TO ACQUIRED ATROPHY OF THYROID: ICD-10-CM

## 2018-07-31 DIAGNOSIS — M25.512 BILATERAL SHOULDER PAIN, UNSPECIFIED CHRONICITY: ICD-10-CM

## 2018-07-31 LAB
ANION GAP SERPL CALCULATED.3IONS-SCNC: 16 MMOL/L (ref 3–16)
BUN BLDV-MCNC: 7 MG/DL (ref 7–20)
CALCIUM SERPL-MCNC: 9.4 MG/DL (ref 8.3–10.6)
CHLORIDE BLD-SCNC: 101 MMOL/L (ref 99–110)
CO2: 25 MMOL/L (ref 21–32)
CREAT SERPL-MCNC: <0.5 MG/DL (ref 0.6–1.1)
CREATININE URINE: 77 MG/DL (ref 28–259)
GFR AFRICAN AMERICAN: >60
GFR NON-AFRICAN AMERICAN: >60
GLUCOSE BLD-MCNC: 135 MG/DL (ref 70–99)
HBA1C MFR BLD: 7.5 %
MICROALBUMIN UR-MCNC: <1.2 MG/DL
MICROALBUMIN/CREAT UR-RTO: NORMAL MG/G (ref 0–30)
POTASSIUM SERPL-SCNC: 3.8 MMOL/L (ref 3.5–5.1)
SODIUM BLD-SCNC: 142 MMOL/L (ref 136–145)
TSH REFLEX FT4: 0.34 UIU/ML (ref 0.27–4.2)

## 2018-07-31 PROCEDURE — 3017F COLORECTAL CA SCREEN DOC REV: CPT | Performed by: INTERNAL MEDICINE

## 2018-07-31 PROCEDURE — 99214 OFFICE O/P EST MOD 30 MIN: CPT | Performed by: INTERNAL MEDICINE

## 2018-07-31 PROCEDURE — G8427 DOCREV CUR MEDS BY ELIG CLIN: HCPCS | Performed by: INTERNAL MEDICINE

## 2018-07-31 PROCEDURE — 3014F SCREEN MAMMO DOC REV: CPT | Performed by: INTERNAL MEDICINE

## 2018-07-31 PROCEDURE — 1036F TOBACCO NON-USER: CPT | Performed by: INTERNAL MEDICINE

## 2018-07-31 PROCEDURE — G8417 CALC BMI ABV UP PARAM F/U: HCPCS | Performed by: INTERNAL MEDICINE

## 2018-07-31 PROCEDURE — 2022F DILAT RTA XM EVC RTNOPTHY: CPT | Performed by: INTERNAL MEDICINE

## 2018-07-31 PROCEDURE — 36415 COLL VENOUS BLD VENIPUNCTURE: CPT | Performed by: INTERNAL MEDICINE

## 2018-07-31 PROCEDURE — 83036 HEMOGLOBIN GLYCOSYLATED A1C: CPT | Performed by: INTERNAL MEDICINE

## 2018-07-31 PROCEDURE — 93000 ELECTROCARDIOGRAM COMPLETE: CPT | Performed by: INTERNAL MEDICINE

## 2018-07-31 PROCEDURE — 3045F PR MOST RECENT HEMOGLOBIN A1C LEVEL 7.0-9.0%: CPT | Performed by: INTERNAL MEDICINE

## 2018-07-31 RX ORDER — DIPHENHYDRAMINE HYDROCHLORIDE 25 MG/1
TABLET ORAL
COMMUNITY

## 2018-07-31 RX ORDER — NAPROXEN 500 MG/1
TABLET ORAL
Qty: 60 TABLET | Refills: 0 | Status: SHIPPED | OUTPATIENT
Start: 2018-07-31 | End: 2018-09-23 | Stop reason: SDUPTHER

## 2018-07-31 ASSESSMENT — PATIENT HEALTH QUESTIONNAIRE - PHQ9
SUM OF ALL RESPONSES TO PHQ QUESTIONS 1-9: 0
2. FEELING DOWN, DEPRESSED OR HOPELESS: 0
SUM OF ALL RESPONSES TO PHQ9 QUESTIONS 1 & 2: 0
1. LITTLE INTEREST OR PLEASURE IN DOING THINGS: 0

## 2018-07-31 NOTE — PATIENT INSTRUCTIONS
Patient Education        Rotator Cuff: Exercises  Your Care Instructions  Here are some examples of typical rehabilitation exercises for your condition. Start each exercise slowly. Ease off the exercise if you start to have pain. Your doctor or physical therapist will tell you when you can start these exercises and which ones will work best for you. How to do the exercises  Pendulum swing    If you have pain in your back, do not do this exercise. 1. Hold on to a table or the back of a chair with your good arm. Then bend forward a little and let your sore arm hang straight down. This exercise does not use the arm muscles. Rather, use your legs and your hips to create movement that makes your arm swing freely. 2. Use the movement from your hips and legs to guide the slightly swinging arm back and forth like a pendulum (or elephant trunk). Then guide it in circles that start small (about the size of a dinner plate). Make the circles a bit larger each day, as your pain allows. 3. Do this exercise for 5 minutes, 5 to 7 times each day. 4. As you have less pain, try bending over a little farther to do this exercise. This will increase the amount of movement at your shoulder. Posterior stretching exercise    1. Hold the elbow of your injured arm with your other hand. 2. Use your hand to pull your injured arm gently up and across your body. You will feel a gentle stretch across the back of your injured shoulder. 3. Hold for at least 15 to 30 seconds. Then slowly lower your arm. 4. Repeat 2 to 4 times. Up-the-back stretch    Your doctor or physical therapist may want you to wait to do this stretch until you have regained most of your range of motion and strength. You can do this stretch in different ways. Hold any of these stretches for at least 15 to 30 seconds. Repeat them 2 to 4 times. 1. Put your hand in your back pocket. Let it rest there to stretch your shoulder.   2. With your other hand, hold your injured arm (palm outward) behind your back by the wrist. Pull your arm up gently to stretch your shoulder. 3. Next, put a towel over your other shoulder. Put the hand of your injured arm behind your back. Now hold the back end of the towel. With the other hand, hold the front end of the towel in front of your body. Pull gently on the front end of the towel. This will bring your hand farther up your back to stretch your shoulder. Overhead stretch    1. Standing about an arm's length away, grasp onto a solid surface. You could use a countertop, a doorknob, or the back of a sturdy chair. 2. With your knees slightly bent, bend forward with your arms straight. Lower your upper body, and let your shoulders stretch. 3. As your shoulders are able to stretch farther, you may need to take a step or two backward. 4. Hold for at least 15 to 30 seconds. Then stand up and relax. If you had stepped back during your stretch, step forward so you can keep your hands on the solid surface. 5. Repeat 2 to 4 times. Shoulder flexion (lying down)    To make a wand for this exercise, use a piece of PVC pipe or a broom handle with the broom removed. Make the wand about a foot wider than your shoulders. 1. Lie on your back, holding a wand with both hands. Your palms should face down as you hold the wand. 2. Keeping your elbows straight, slowly raise your arms over your head. Raise them until you feel a stretch in your shoulders, upper back, and chest.  3. Hold for 15 to 30 seconds. 4. Repeat 2 to 4 times. Shoulder rotation (lying down)    To make a wand for this exercise, use a piece of PVC pipe or a broom handle with the broom removed. Make the wand about a foot wider than your shoulders. 1. Lie on your back. Hold a wand with both hands with your elbows bent and palms up. 2. Keep your elbows close to your body, and move the wand across your body toward the sore arm. 3. Hold for 8 to 12 seconds. 4. Repeat 2 to 4 times.   Wall climbing (to the side)    Avoid any movement that is straight to your side, and be careful not to arch your back. Your arm should stay about 30 degrees to the front of your side. 1. Stand with your side to a wall so that your fingers can just touch it at an angle about 30 degrees toward the front of your body. 2. Walk the fingers of your injured arm up the wall as high as pain permits. Try not to shrug your shoulder up toward your ear as you move your arm up. 3. Hold that position for a count of at least 15 to 20.  4. Walk your fingers back down to the starting position. 5. Repeat at least 2 to 4 times. Try to reach higher each time. Wall climbing (to the front)    During this stretching exercise, be careful not to arch your back. 1. Face a wall, and stand so your fingers can just touch it. 2. Keeping your shoulder down, walk the fingers of your injured arm up the wall as high as pain permits. (Don't shrug your shoulder up toward your ear.)  3. Hold your arm in that position for at least 15 to 30 seconds. 4. Slowly walk your fingers back down to where you started. 5. Repeat at least 2 to 4 times. Try to reach higher each time. Shoulder blade squeeze    1. Stand with your arms at your sides, and squeeze your shoulder blades together. Do not raise your shoulders up as you squeeze. 2. Hold 6 seconds. 3. Repeat 8 to 12 times. Scapular exercise: Arm reach    1. Lie flat on your back. This exercise is a very slight motion that starts with your arms raised (elbows straight, arms straight). 2. From this position, reach higher toward the gricelda or ceiling. Keep your elbows straight. All motion should be from your shoulder blade only. 3. Relax your arms back to where you started. 4. Repeat 8 to 12 times. Arm raise to the side    During this strengthening exercise, your arm should stay about 30 degrees to the front of your side. 1. Slowly raise your injured arm to the side, with your thumb facing up.  Raise your arm 60 couch, then to a sturdy chair, and finally to the floor. Scapular exercise: Retraction    For this exercise, you will need elastic exercise material, such as surgical tubing or Thera-Band. 1. Put the band around a solid object at about waist level. (A bedpost will work well.) Each hand should hold an end of the band. 2. With your elbows at your sides and bent to 90 degrees, pull the band back. Your shoulder blades should move toward each other. Then move your arms back where you started. 3. Repeat 8 to 12 times. 4. If you have good range of motion in your shoulders, try this exercise with your arms lifted out to the sides. Keep your elbows at a 90-degree angle. Raise the elastic band up to about shoulder level. Pull the band back to move your shoulder blades toward each other. Then move your arms back where you started. Internal rotator strengthening exercise    1. Start by tying a piece of elastic exercise material to a doorknob. You can use surgical tubing or Thera-Band. 2. Stand or sit with your shoulder relaxed and your elbow bent 90 degrees. Your upper arm should rest comfortably against your side. Squeeze a rolled towel between your elbow and your body for comfort. This will help keep your arm at your side. 3. Hold one end of the elastic band in the hand of the painful arm. 4. Slowly rotate your forearm toward your body until it touches your belly. Slowly move it back to where you started. 5. Keep your elbow and upper arm firmly tucked against the towel roll or at your side. 6. Repeat 8 to 12 times. External rotator strengthening exercise    1. Start by tying a piece of elastic exercise material to a doorknob. You can use surgical tubing or Thera-Band. (You may also hold one end of the band in each hand.)  2. Stand or sit with your shoulder relaxed and your elbow bent 90 degrees. Your upper arm should rest comfortably against your side.  Squeeze a rolled towel between your elbow and your body for more about \"Starting a Weight Loss Plan: Care Instructions. \"     If you do not have an account, please click on the \"Sign Up Now\" link. Current as of: October 9, 2017  Content Version: 11.6  © 4981-9479 [employer]. Care instructions adapted under license by [facility] ([facility]). If you have questions about a medical condition or this instruction, always ask your healthcare professional. [address] any warranty or liability for your use of this information. Patient Education        Learning About Low-Carbohydrate Diets for Weight Loss  What is a low-carbohydrate diet? Low-carb diets avoid foods that are high in carbohydrate. These high-carb foods include pasta, bread, rice, cereal, fruits, and starchy vegetables. Instead, these diets usually have you eat foods that are high in fat and protein. Many people lose weight quickly on a low-carb diet. But the early weight loss is water. People on this diet often gain the weight back after they start eating carbs again. Not all diet plans are safe or work well. A lot of the evidence shows that low-carb diets aren't healthy. That's because these diets often don't include healthy foods like fruits and vegetables. Losing weight safely means balancing protein, fat, and carbs with every meal and snack. And low-carb diets don't always provide the vitamins, minerals, and fiber you need. If you have a serious medical condition, talk to your doctor before you try any diet. These conditions include kidney disease, heart disease, type 2 diabetes, high cholesterol, and high blood pressure. If you are pregnant, it may not be safe for your baby if you are on a low-carb diet. How can you lose weight safely? You might have heard that a diet plan helped another person lose weight. But that doesn't mean that it will work for you. It is very hard to stay on a diet that includes lots of big changes in your eating habits.  If you want to get to a healthy weight and stay there, making healthy lifestyle changes will often work better than dieting. These steps can help. · Make a plan for change. Work with your doctor to create a plan that is right for you. · See a dietitian. He or she can show you how to make healthy changes in your eating habits. · Manage stress. If you have a lot of stress in your life, it can be hard to focus on making healthy changes to your daily habits. · Track your food and activity. You are likely to do better at losing weight if you keep track of what you eat and what you do. Follow-up care is a key part of your treatment and safety. Be sure to make and go to all appointments, and call your doctor if you are having problems. It's also a good idea to know your test results and keep a list of the medicines you take. Where can you learn more? Go to https://[employer]armapenoryewbrigido.SA Ignite. org and sign in to your [employer] account. Enter A121 in the [employer] box to learn more about \"Learning About Low-Carbohydrate Diets for Weight Loss. \"     If you do not have an account, please click on the \"Sign Up Now\" link. Current as of: May 12, 2017  Content Version: 11.6  © 6805-1224 [employer]. Care instructions adapted under license by [facility] ([facility]). If you have questions about a medical condition or this instruction, always ask your healthcare professional. [place] [address] any warranty or liability for your use of this information. Shoulder stretching left daily  Walk 30 min daily  May use ice heat and naprosyn twice daily.   Stop naprosyn if stomach upset  Check sugars in evening also  Follow up 1 month for weight, shoulder and sugar report

## 2018-07-31 NOTE — PROGRESS NOTES
HPI: Olivier Gordon presents for shoulder pain    Right shoulder strain 6 weeks ago. Continued to have discomfort. Good range of motion however painful. No paresthesias or weakness. Over the last 2 days she is noted intermittent tint squeezing sensation in the upper left arm. Is not exertional in nature. Lasts several seconds in duration. Can occur 2 or 3 times an hour. Does not wake her from sleep. Using no medications for this. Symptoms seem to be worsening. EM 2. A1c of 7.6 after getting back on medicine. No retinopathy. Positive microalbumin. Uses ARB. Positive statin, glipizide, metformin, thick toes, and has discontinued Januvia secondary to cost. No paresthesias. Not following diet strictly or exercising. Is due repeat laboratories today. Not fasting.        no abnormal pap. No STD concerns. No fh ovarian or breast cancer in family, pap  [place]. utd with mammograms. Tubal pregnancy.      CT of the abdomen with splenic aneurysm. Recheck due in 10 of 2018.      Colonoscopy due May 2018.         PMH:    Cholecystectomy  Arzella Bridegroom    Hx tubal pregnancy laparotomy      SH:  daughter 12.  . Tobacco cessation 60 packyears discontinued 20 yr ago. Rare alcohol. Rare sex. Dyspareunia. No drugs     FH: 2 sisters 1 brother    + DM,MGF suicide, dementia in 66's     - colon, breast, ovarina cancer.     Review of systems: Remote concussion. Cataract. No chronic sinus symptoms. Denies any wheezing pneumonias or abnormal chest x-rays. Denies emphysema. No chest pain palpitations or syncope. No breast masses. Denies any GE reflux or bloody stools. No kidney stones or recurrent bladder infections. Rare knee pain. Tinea of the toes.  Dyspareunia    Constitutional, ent, CV, respiratory, GI, , joint, skin, allergic and psychiatric ROS negative except for above    Allergies   Allergen Reactions    Lisinopril      Cough/scratchy throat       Outpatient Prescriptions Marked as Taking for the 18 encounter (Office Visit) with Ruddy Milton MD   Medication Sig Dispense Refill    Biotin (BIOTIN 5000) 5 MG CAPS Take by mouth      glipiZIDE (GLUCOTROL XL) 10 MG extended release tablet TAKE ONE TABLET BY MOUTH TWICE A  tablet 0    levothyroxine (SYNTHROID) 125 MCG tablet TAKE ONE TABLET BY MOUTH DAILY 90 tablet 1    simvastatin (ZOCOR) 20 MG tablet TAKE ONE TABLET BY MOUTH EVERY NIGHT AT BEDTIME 90 tablet 1    metFORMIN (GLUCOPHAGE) 1000 MG tablet TAKE 1 TABLET BY MOUTH TWICE A DAY WITH MEALS 180 tablet 1    Liraglutide (VICTOZA) 18 MG/3ML SOPN SC injection 1.8mg SC q day. 4 pen 5    losartan (COZAAR) 25 MG tablet TAKE ONE TABLET BY MOUTH DAILY 990 tablet 3    JANUVIA 100 MG tablet TAKE ONE TABLET BY MOUTH EVERY MORNING 90 tablet 0    vitamin D (CHOLECALCIFEROL) 1000 UNIT TABS tablet Take 1,000 Units by mouth daily      Esomeprazole Magnesium (NEXIUM PO) Take by mouth daily               Past Medical History:   Diagnosis Date    GERD (gastroesophageal reflux disease)     Hyperlipidemia     Hypothyroidism     Type II or unspecified type diabetes mellitus without mention of complication, not stated as uncontrolled        Past Surgical History:   Procedure Laterality Date     SECTION      x 4    CHOLECYSTECTOMY           Social History     Social History    Marital status:      Spouse name: N/A    Number of children: N/A    Years of education: N/A     Occupational History    Not on file.      Social History Main Topics    Smoking status: Former Smoker     Years: 60.00    Smokeless tobacco: Former User    Alcohol use Yes      Comment: rare    Drug use: No    Sexual activity: Not on file     Other Topics Concern    Not on file     Social History Narrative    No narrative on file       Family History   Problem Relation Age of Onset    Diabetes Father     High Blood Pressure Father     Heart Attack Paternal Grandfather            Review of

## 2018-08-01 RX ORDER — GLIPIZIDE 10 MG/1
TABLET, FILM COATED, EXTENDED RELEASE ORAL
Qty: 180 TABLET | Refills: 0 | Status: SHIPPED | OUTPATIENT
Start: 2018-08-01 | End: 2018-10-28 | Stop reason: SDUPTHER

## 2018-09-10 ENCOUNTER — OFFICE VISIT (OUTPATIENT)
Dept: FAMILY MEDICINE CLINIC | Age: 58
End: 2018-09-10

## 2018-09-10 VITALS
HEART RATE: 84 BPM | RESPIRATION RATE: 12 BRPM | OXYGEN SATURATION: 94 % | WEIGHT: 212 LBS | HEIGHT: 63 IN | BODY MASS INDEX: 37.56 KG/M2 | SYSTOLIC BLOOD PRESSURE: 106 MMHG | DIASTOLIC BLOOD PRESSURE: 70 MMHG

## 2018-09-10 DIAGNOSIS — Z23 NEED FOR IMMUNIZATION AGAINST COMBINATION OF INFECTIOUS DISEASES: ICD-10-CM

## 2018-09-10 DIAGNOSIS — E78.00 PURE HYPERCHOLESTEROLEMIA: ICD-10-CM

## 2018-09-10 DIAGNOSIS — H81.09 MENIERE'S DISEASE, UNSPECIFIED LATERALITY: Primary | ICD-10-CM

## 2018-09-10 LAB
CHOLESTEROL, FASTING: 162 MG/DL (ref 0–199)
HDLC SERPL-MCNC: 38 MG/DL (ref 40–60)
LDL CHOLESTEROL CALCULATED: 82 MG/DL
TRIGLYCERIDE, FASTING: 212 MG/DL (ref 0–150)
VLDLC SERPL CALC-MCNC: 42 MG/DL

## 2018-09-10 PROCEDURE — G8417 CALC BMI ABV UP PARAM F/U: HCPCS | Performed by: INTERNAL MEDICINE

## 2018-09-10 PROCEDURE — 3017F COLORECTAL CA SCREEN DOC REV: CPT | Performed by: INTERNAL MEDICINE

## 2018-09-10 PROCEDURE — 99213 OFFICE O/P EST LOW 20 MIN: CPT | Performed by: INTERNAL MEDICINE

## 2018-09-10 PROCEDURE — 3045F PR MOST RECENT HEMOGLOBIN A1C LEVEL 7.0-9.0%: CPT | Performed by: INTERNAL MEDICINE

## 2018-09-10 PROCEDURE — 90471 IMMUNIZATION ADMIN: CPT | Performed by: INTERNAL MEDICINE

## 2018-09-10 PROCEDURE — G8427 DOCREV CUR MEDS BY ELIG CLIN: HCPCS | Performed by: INTERNAL MEDICINE

## 2018-09-10 PROCEDURE — 2022F DILAT RTA XM EVC RTNOPTHY: CPT | Performed by: INTERNAL MEDICINE

## 2018-09-10 PROCEDURE — 3014F SCREEN MAMMO DOC REV: CPT | Performed by: INTERNAL MEDICINE

## 2018-09-10 PROCEDURE — 36415 COLL VENOUS BLD VENIPUNCTURE: CPT | Performed by: INTERNAL MEDICINE

## 2018-09-10 PROCEDURE — 90682 RIV4 VACC RECOMBINANT DNA IM: CPT | Performed by: INTERNAL MEDICINE

## 2018-09-10 PROCEDURE — 1036F TOBACCO NON-USER: CPT | Performed by: INTERNAL MEDICINE

## 2018-09-10 RX ORDER — MECLIZINE HYDROCHLORIDE 25 MG/1
25 TABLET ORAL 3 TIMES DAILY PRN
Qty: 20 TABLET | Refills: 0 | Status: SHIPPED | OUTPATIENT
Start: 2018-09-10 | End: 2019-01-14

## 2018-09-10 NOTE — PROGRESS NOTES
HPI: Chastity Jean presents for general symptoms. Diagnosis Ménière's in evaluation over decades ago. Has seen ENT who agrees with diagnosis. Onset yesterday of' her typical symptoms. Eyes any roaring in the ears. Denies any fevers, chills, dietary indiscretion. No trauma. No headache or confusion. No falls. States she generally improves with some type of medicine within 24-48 hours. Is aware of a low-salt diet. Looking through her records shows that she was on Antivert in the past. She is not interested in following back up with ENT. States her hearing is diminished.     DM 2. A1c of 7.5  2018 No retinopathy. Positive microalbumin. Uses ARB. Positive statin, glipizide, metformin, , and has discontinued Januvia secondary to cost. No paresthesias. Not following diet strictly or exercising        no abnormal pap. No STD concerns. No fh ovarian or breast cancer in family, pap  [place]. utd with mammograms. Tubal pregnancy.      CT of the abdomen with splenic aneurysm. Recheck due in 10 of 2018.      Colonoscopy due May 2018.         PMH:    Cholecystectomy  Marrion Fresh    Hx tubal pregnancy laparotomy      SH:  daughter 12.  . Tobacco cessation 60 packyears discontinued 20 yr ago. Rare alcohol. Rare sex. Dyspareunia. No drugs     FH: 2 sisters 1 brother    + DM,MGF suicide, dementia in 66's     - colon, breast, ovarina cancer.     Review of systems: Remote concussion. Cataract. No chronic sinus symptoms. Denies any wheezing pneumonias or abnormal chest x-rays. Denies emphysema. No chest pain palpitations or syncope. No breast masses. Denies any GE reflux or bloody stools. No kidney stones or recurrent bladder infections. Rare knee pain. Tinea of the toes.  Dyspareunia     Constitutional, ent, CV, respiratory, GI, , joint, skin, allergic and psychiatric ROS negative except for above    Allergies   Allergen Reactions    Lisinopril      Cough/scratchy throat Outpatient Prescriptions Marked as Taking for the 9/10/18 encounter (Office Visit) with Anna Quintana MD   Medication Sig Dispense Refill    glipiZIDE (GLUCOTROL XL) 10 MG extended release tablet TAKE ONE TABLET BY MOUTH TWICE A  tablet 0    Biotin (BIOTIN 5000) 5 MG CAPS Take by mouth      naproxen (NAPROSYN) 500 MG tablet 1 po bid with meals. Stop if hearburn. No refill 60 tablet 0    levothyroxine (SYNTHROID) 125 MCG tablet TAKE ONE TABLET BY MOUTH DAILY 90 tablet 1    simvastatin (ZOCOR) 20 MG tablet TAKE ONE TABLET BY MOUTH EVERY NIGHT AT BEDTIME 90 tablet 1    metFORMIN (GLUCOPHAGE) 1000 MG tablet TAKE 1 TABLET BY MOUTH TWICE A DAY WITH MEALS 180 tablet 1    Liraglutide (VICTOZA) 18 MG/3ML SOPN SC injection 1.8mg SC q day. 4 pen 5    losartan (COZAAR) 25 MG tablet TAKE ONE TABLET BY MOUTH DAILY 990 tablet 3    vitamin D (CHOLECALCIFEROL) 1000 UNIT TABS tablet Take 1,000 Units by mouth daily      Esomeprazole Magnesium (NEXIUM PO) Take by mouth daily               Past Medical History:   Diagnosis Date    GERD (gastroesophageal reflux disease)     Hyperlipidemia     Hypothyroidism     Type II or unspecified type diabetes mellitus without mention of complication, not stated as uncontrolled        Past Surgical History:   Procedure Laterality Date     SECTION      x 4    CHOLECYSTECTOMY           Social History     Social History    Marital status:      Spouse name: N/A    Number of children: N/A    Years of education: N/A     Occupational History    Not on file.      Social History Main Topics    Smoking status: Former Smoker     Packs/day: 3.00     Years: 60.00     Types: Cigarettes     Quit date: 9/10/1993    Smokeless tobacco: Former User    Alcohol use Yes      Comment: rare    Drug use: No    Sexual activity: Yes     Partners: Male      Comment: rare      Other Topics Concern    Not on file     Social History Narrative    No narrative on file

## 2018-09-10 NOTE — PATIENT INSTRUCTIONS
can make you aware of any improvements. Include the date, the time you spent exercising, how often your eyes were open or closed, and how you felt during each exercise. ¨ Walk 5 steps and then stop abruptly. Wait for any dizzy feeling to go away, and do it again. Repeat until you have walked about 50 feet. Walking exercises for vertigo may improve your balance and your symptoms of vertigo. You may want to have someone next to you while you do these exercises in case you lose your balance. ¨ For another walking exercise, walk 5 steps, turn, and walk back. Wait for any dizziness to go away. Repeat 5 more times. ¨ For harder exercise, walk and turn your head to the right or left with every other step. Try to walk about 50 feet. Repeat the exercise while moving your head up and down. Then repeat while moving your head up to one side and down to the other side, and then switching. ¨ Prepare a list that shows the distance you walked, how often you walked, and how you felt while you were walking. · Make sure your home is safe for those times when you have an attack of vertigo. ¨ Get rid of throw rugs, and use nonskid mats. ¨ Use grab bars near the bathtub and toilet. ¨ Use night-lights. ¨ Keep floors dry to prevent slipping. ¨ Store items you use often on low shelves so you do not have to climb or reach high. If you have to climb, use a step stool with handrails. ¨ Keep driveways, sidewalks, and other walkways clear of things that might cause you to trip. · There are other steps you can take to stay safe. ¨ Avoid driving or working at heights. ¨ Wear shoes with low heels and nonslip soles. ¨ Keep your shoes tied. ¨ Alert family and friends to your condition. ¨ Know whether medicines you take can affect your sense of balance. When should you call for help? Call 911 anytime you think you may need emergency care.  For example, call if:    · You passed out (lost consciousness).     · You have symptoms of a stroke. These may include:  ¨ Sudden numbness, tingling, weakness, or loss of movement in your face, arm, or leg, especially on only one side of your body. ¨ Sudden vision changes. ¨ Sudden trouble speaking. ¨ Sudden confusion or trouble understanding simple statements. ¨ Sudden problems with walking or balance. ¨ A sudden, severe headache that is different from past headaches.    Call your doctor now or seek immediate medical care if:    · You have new or worse dizziness.     · You notice changes in your hearing.    Watch closely for changes in your health, and be sure to contact your doctor if:    · You have new or worse nausea or vomiting.     · Your vertigo gets worse.     · You do not get better as expected. Where can you learn more? Go to https://Ord[employer].paraBebes.com. org and sign in to your [employer] account. Enter N187 in the [employer] box to learn more about \"Ménière's Disease: Care Instructions. \"     If you do not have an account, please click on the \"Sign Up Now\" link. Current as of: May 12, 2017  Content Version: 11.7  © 6463-1593 [employer]. Care instructions adapted under license by [facility] ([facility]). If you have questions about a medical condition or this instruction, always ask your healthcare professional. [address]  any warranty or liability for your use of this information.

## 2018-09-24 RX ORDER — NAPROXEN 500 MG/1
TABLET ORAL
Qty: 60 TABLET | Refills: 0 | Status: SHIPPED | OUTPATIENT
Start: 2018-09-24 | End: 2019-01-14

## 2018-10-28 DIAGNOSIS — E03.4 HYPOTHYROIDISM DUE TO ACQUIRED ATROPHY OF THYROID: ICD-10-CM

## 2018-10-28 DIAGNOSIS — E78.00 PURE HYPERCHOLESTEROLEMIA: ICD-10-CM

## 2018-10-29 RX ORDER — LEVOTHYROXINE SODIUM 0.12 MG/1
TABLET ORAL
Qty: 90 TABLET | Refills: 0 | Status: SHIPPED | OUTPATIENT
Start: 2018-10-29 | End: 2019-01-14 | Stop reason: SDUPTHER

## 2018-10-29 RX ORDER — SIMVASTATIN 20 MG
TABLET ORAL
Qty: 90 TABLET | Refills: 0 | Status: SHIPPED | OUTPATIENT
Start: 2018-10-29 | End: 2019-01-14 | Stop reason: SDUPTHER

## 2018-10-29 RX ORDER — GLIPIZIDE 10 MG/1
TABLET, FILM COATED, EXTENDED RELEASE ORAL
Qty: 180 TABLET | Refills: 0 | Status: SHIPPED | OUTPATIENT
Start: 2018-10-29 | End: 2019-01-14 | Stop reason: SDUPTHER

## 2019-01-14 ENCOUNTER — OFFICE VISIT (OUTPATIENT)
Dept: FAMILY MEDICINE CLINIC | Age: 59
End: 2019-01-14
Payer: COMMERCIAL

## 2019-01-14 VITALS
WEIGHT: 215 LBS | DIASTOLIC BLOOD PRESSURE: 82 MMHG | SYSTOLIC BLOOD PRESSURE: 125 MMHG | HEIGHT: 63 IN | OXYGEN SATURATION: 96 % | HEART RATE: 82 BPM | RESPIRATION RATE: 16 BRPM | BODY MASS INDEX: 38.09 KG/M2

## 2019-01-14 DIAGNOSIS — R11.10 REGURGITATION OF FOOD: ICD-10-CM

## 2019-01-14 DIAGNOSIS — E03.4 HYPOTHYROIDISM DUE TO ACQUIRED ATROPHY OF THYROID: ICD-10-CM

## 2019-01-14 DIAGNOSIS — E11.65 UNCONTROLLED TYPE 2 DIABETES MELLITUS WITH HYPERGLYCEMIA (HCC): Primary | ICD-10-CM

## 2019-01-14 DIAGNOSIS — E78.00 PURE HYPERCHOLESTEROLEMIA: ICD-10-CM

## 2019-01-14 DIAGNOSIS — H81.09 MENIERE'S DISEASE, UNSPECIFIED LATERALITY: ICD-10-CM

## 2019-01-14 DIAGNOSIS — Z86.010 HISTORY OF COLON POLYPS: ICD-10-CM

## 2019-01-14 DIAGNOSIS — I72.8 SPLENIC ARTERY ANEURYSM (HCC): ICD-10-CM

## 2019-01-14 PROBLEM — Z86.0100 HISTORY OF COLON POLYPS: Status: ACTIVE | Noted: 2019-01-14

## 2019-01-14 LAB — HBA1C MFR BLD: 7.6 %

## 2019-01-14 PROCEDURE — 99213 OFFICE O/P EST LOW 20 MIN: CPT | Performed by: INTERNAL MEDICINE

## 2019-01-14 PROCEDURE — 83036 HEMOGLOBIN GLYCOSYLATED A1C: CPT | Performed by: INTERNAL MEDICINE

## 2019-01-14 RX ORDER — LOSARTAN POTASSIUM 25 MG/1
TABLET ORAL
Qty: 90 TABLET | Refills: 1 | Status: SHIPPED | OUTPATIENT
Start: 2019-01-14 | End: 2019-10-30

## 2019-01-14 RX ORDER — SIMVASTATIN 20 MG
TABLET ORAL
Qty: 90 TABLET | Refills: 1 | Status: SHIPPED | OUTPATIENT
Start: 2019-01-14 | End: 2019-08-02 | Stop reason: SDUPTHER

## 2019-01-14 RX ORDER — GLIPIZIDE 10 MG/1
TABLET, FILM COATED, EXTENDED RELEASE ORAL
Qty: 180 TABLET | Refills: 1 | Status: SHIPPED | OUTPATIENT
Start: 2019-01-14 | End: 2019-10-23 | Stop reason: SDUPTHER

## 2019-01-14 RX ORDER — LEVOTHYROXINE SODIUM 0.12 MG/1
TABLET ORAL
Qty: 90 TABLET | Refills: 1 | Status: SHIPPED | OUTPATIENT
Start: 2019-01-14 | End: 2019-08-02 | Stop reason: SDUPTHER

## 2019-02-01 ENCOUNTER — INITIAL CONSULT (OUTPATIENT)
Dept: SURGERY | Age: 59
End: 2019-02-01
Payer: COMMERCIAL

## 2019-02-01 VITALS
HEART RATE: 85 BPM | WEIGHT: 215 LBS | DIASTOLIC BLOOD PRESSURE: 79 MMHG | SYSTOLIC BLOOD PRESSURE: 117 MMHG | HEIGHT: 63 IN | BODY MASS INDEX: 38.09 KG/M2

## 2019-02-01 DIAGNOSIS — R59.0 ENLARGED LYMPH NODE IN NECK: ICD-10-CM

## 2019-02-01 DIAGNOSIS — E03.4 HYPOTHYROIDISM DUE TO ACQUIRED ATROPHY OF THYROID: ICD-10-CM

## 2019-02-01 DIAGNOSIS — K42.9 UMBILICAL HERNIA WITHOUT OBSTRUCTION AND WITHOUT GANGRENE: ICD-10-CM

## 2019-02-01 DIAGNOSIS — I72.8 SPLENIC ARTERY ANEURYSM (HCC): Primary | ICD-10-CM

## 2019-02-01 PROCEDURE — G8482 FLU IMMUNIZE ORDER/ADMIN: HCPCS | Performed by: SURGERY

## 2019-02-01 PROCEDURE — 99214 OFFICE O/P EST MOD 30 MIN: CPT | Performed by: SURGERY

## 2019-02-01 PROCEDURE — 1036F TOBACCO NON-USER: CPT | Performed by: SURGERY

## 2019-02-01 PROCEDURE — G8427 DOCREV CUR MEDS BY ELIG CLIN: HCPCS | Performed by: SURGERY

## 2019-02-01 PROCEDURE — 3017F COLORECTAL CA SCREEN DOC REV: CPT | Performed by: SURGERY

## 2019-02-01 PROCEDURE — G8417 CALC BMI ABV UP PARAM F/U: HCPCS | Performed by: SURGERY

## 2019-02-01 ASSESSMENT — ENCOUNTER SYMPTOMS
EYE DISCHARGE: 0
PHOTOPHOBIA: 0
EYE PAIN: 0
EYE REDNESS: 0
ALLERGIC/IMMUNOLOGIC NEGATIVE: 1
EYE ITCHING: 0
GASTROINTESTINAL NEGATIVE: 1

## 2019-02-19 ENCOUNTER — PROCEDURE VISIT (OUTPATIENT)
Dept: SURGERY | Age: 59
End: 2019-02-19

## 2019-02-19 DIAGNOSIS — I72.8 SPLENIC ARTERY ANEURYSM (HCC): ICD-10-CM

## 2019-05-03 ENCOUNTER — OFFICE VISIT (OUTPATIENT)
Dept: FAMILY MEDICINE CLINIC | Age: 59
End: 2019-05-03
Payer: COMMERCIAL

## 2019-05-03 VITALS
BODY MASS INDEX: 37.76 KG/M2 | SYSTOLIC BLOOD PRESSURE: 124 MMHG | TEMPERATURE: 102.5 F | DIASTOLIC BLOOD PRESSURE: 72 MMHG | HEART RATE: 130 BPM | WEIGHT: 213.13 LBS | HEIGHT: 63 IN | OXYGEN SATURATION: 99 %

## 2019-05-03 DIAGNOSIS — J02.0 STREP THROAT: Primary | ICD-10-CM

## 2019-05-03 DIAGNOSIS — R50.81 FEVER IN OTHER DISEASES: ICD-10-CM

## 2019-05-03 LAB
INFLUENZA VIRUS A RNA: NEGATIVE
INFLUENZA VIRUS B RNA: NEGATIVE
STREPTOCOCCUS A RNA: NORMAL

## 2019-05-03 PROCEDURE — G8428 CUR MEDS NOT DOCUMENT: HCPCS | Performed by: FAMILY MEDICINE

## 2019-05-03 PROCEDURE — G8417 CALC BMI ABV UP PARAM F/U: HCPCS | Performed by: FAMILY MEDICINE

## 2019-05-03 PROCEDURE — 99213 OFFICE O/P EST LOW 20 MIN: CPT | Performed by: FAMILY MEDICINE

## 2019-05-03 PROCEDURE — 1036F TOBACCO NON-USER: CPT | Performed by: FAMILY MEDICINE

## 2019-05-03 PROCEDURE — 87651 STREP A DNA AMP PROBE: CPT | Performed by: FAMILY MEDICINE

## 2019-05-03 PROCEDURE — 3017F COLORECTAL CA SCREEN DOC REV: CPT | Performed by: FAMILY MEDICINE

## 2019-05-03 PROCEDURE — 87502 INFLUENZA DNA AMP PROBE: CPT | Performed by: FAMILY MEDICINE

## 2019-05-03 PROCEDURE — 96372 THER/PROPH/DIAG INJ SC/IM: CPT | Performed by: FAMILY MEDICINE

## 2019-05-03 ASSESSMENT — PATIENT HEALTH QUESTIONNAIRE - PHQ9
SUM OF ALL RESPONSES TO PHQ QUESTIONS 1-9: 0
SUM OF ALL RESPONSES TO PHQ9 QUESTIONS 1 & 2: 0
1. LITTLE INTEREST OR PLEASURE IN DOING THINGS: 0
2. FEELING DOWN, DEPRESSED OR HOPELESS: 0
SUM OF ALL RESPONSES TO PHQ QUESTIONS 1-9: 0

## 2019-05-03 NOTE — PROGRESS NOTES
(CHOLECALCIFEROL) 1000 UNIT TABS tablet Take 1,000 Units by mouth daily      Esomeprazole Magnesium (NEXIUM PO) Take by mouth daily         Past Medical History:   Diagnosis Date    GERD (gastroesophageal reflux disease)     History of colon polyps 2019    HYPERPLASTIC POLYP     Hyperlipidemia     Hypothyroidism     Type II or unspecified type diabetes mellitus without mention of complication, not stated as uncontrolled        Past Surgical History:   Procedure Laterality Date     SECTION      x 4    CHOLECYSTECTOMY  10/12/2011    Dr. Jada Horton performed        Social History     Tobacco Use    Smoking status: Former Smoker     Packs/day: 3.00     Years: 60.00     Pack years: 180.00     Types: Cigarettes     Last attempt to quit: 9/10/1993     Years since quittin.6    Smokeless tobacco: Former User   Substance Use Topics    Alcohol use: Yes     Comment: rare       Family History   Problem Relation Age of Onset    Diabetes Father     High Blood Pressure Father     Heart Attack Paternal Grandfather            Review of Systems  See hpi    Objective:   Constitutional:   · Reviewed vitals above  · Well Nourished, well developed, no distress       HENT:  · Normal external nose without lesions  · Bilateral TMs translucent with normal light reflex and bony landmarks  · Oropharynx very erythematous w/o exudate  · Normal nasal mucosa without swelling or erythema  · No pain with palpation of sinuses  Neck:  · Supple with normal ROM w/o pain  · +cervical adenopathy  Resp:  · Normal effort  · Clear to auscultation bilaterally without rhonchi, wheezing or crackles  Cardiovascular:  · On auscultation, normal S1 and S2 without murmurs, rubs or gallops  · No bruits of bilateral carotids and no JVD  Gastrointestinal:  · Nontender, nondistended, and no masses  · No hepatosplenomegaly  Musculoskeletal:  · Normal Gait  · All extremities without clubbing, cyanosis or edema  Skin:  · No rashes on inspection  · No areas of increased heat or induration on palpation  Psych:  · Normal mood and affect  · Normal insight and judgement    Assessment / Plan:     1. Strep throat  Rapid strep was negative but because symptoms are so pathopneumonic for strep will treat with the following while waiting for culture to confirm:  - penicillin G procaine-penicillin G benzathine injection 1.2 Million Units    Discussed side effects and expected course. Pt reported understanding. Patient instructed to stay hydrated and alternate Tylenol and ibuprofen for fever.      - POCT Influenza A/B DNA (Alere i)= neg    Pt told to Call or return to clinic prn if these symptoms worsen or fail to improve as anticipated.

## 2019-05-05 LAB
ORGANISM: ABNORMAL
THROAT CULTURE: ABNORMAL
THROAT CULTURE: ABNORMAL

## 2019-05-06 ENCOUNTER — TELEPHONE (OUTPATIENT)
Dept: FAMILY MEDICINE CLINIC | Age: 59
End: 2019-05-06

## 2019-05-06 NOTE — TELEPHONE ENCOUNTER
PT called in for recent labs, I gave all note's available.      \"Inform pt that her throat culture was positive for strep and she should be feeling better with the medication given\"

## 2019-05-07 ENCOUNTER — TELEPHONE (OUTPATIENT)
Dept: VASCULAR SURGERY | Age: 59
End: 2019-05-07

## 2019-05-07 ENCOUNTER — OFFICE VISIT (OUTPATIENT)
Dept: FAMILY MEDICINE CLINIC | Age: 59
End: 2019-05-07
Payer: COMMERCIAL

## 2019-05-07 VITALS
DIASTOLIC BLOOD PRESSURE: 92 MMHG | BODY MASS INDEX: 36.86 KG/M2 | HEART RATE: 105 BPM | SYSTOLIC BLOOD PRESSURE: 134 MMHG | HEIGHT: 63 IN | WEIGHT: 208 LBS | RESPIRATION RATE: 18 BRPM | OXYGEN SATURATION: 96 %

## 2019-05-07 DIAGNOSIS — K42.9 UMBILICAL HERNIA WITHOUT OBSTRUCTION AND WITHOUT GANGRENE: ICD-10-CM

## 2019-05-07 DIAGNOSIS — E78.00 PURE HYPERCHOLESTEROLEMIA: ICD-10-CM

## 2019-05-07 DIAGNOSIS — E03.4 HYPOTHYROIDISM DUE TO ACQUIRED ATROPHY OF THYROID: ICD-10-CM

## 2019-05-07 DIAGNOSIS — R63.4 WEIGHT LOSS: ICD-10-CM

## 2019-05-07 DIAGNOSIS — R11.0 NAUSEA: Primary | ICD-10-CM

## 2019-05-07 DIAGNOSIS — H81.09 MENIERE'S DISEASE, UNSPECIFIED LATERALITY: ICD-10-CM

## 2019-05-07 DIAGNOSIS — E11.65 UNCONTROLLED TYPE 2 DIABETES MELLITUS WITH HYPERGLYCEMIA (HCC): Primary | ICD-10-CM

## 2019-05-07 DIAGNOSIS — R11.0 NAUSEA: ICD-10-CM

## 2019-05-07 DIAGNOSIS — I72.8 SPLENIC ARTERY ANEURYSM (HCC): ICD-10-CM

## 2019-05-07 LAB
A/G RATIO: 1.2 (ref 1.1–2.2)
ALBUMIN SERPL-MCNC: 3.8 G/DL (ref 3.4–5)
ALP BLD-CCNC: 148 U/L (ref 40–129)
ALT SERPL-CCNC: 16 U/L (ref 10–40)
AMORPHOUS: ABNORMAL /HPF
AMYLASE: 41 U/L (ref 25–115)
ANION GAP SERPL CALCULATED.3IONS-SCNC: 16 MMOL/L (ref 3–16)
AST SERPL-CCNC: 18 U/L (ref 15–37)
BACTERIA: ABNORMAL /HPF
BASOPHILS ABSOLUTE: 0.1 K/UL (ref 0–0.2)
BASOPHILS RELATIVE PERCENT: 0.5 %
BETA-HYDROXYBUTYRATE: 0.19 MMOL/L (ref 0–0.27)
BILIRUB SERPL-MCNC: 1.2 MG/DL (ref 0–1)
BILIRUBIN URINE: ABNORMAL
BLOOD, URINE: ABNORMAL
BUN BLDV-MCNC: 7 MG/DL (ref 7–20)
CALCIUM SERPL-MCNC: 9.4 MG/DL (ref 8.3–10.6)
CASTS: ABNORMAL /LPF
CHLORIDE BLD-SCNC: 101 MMOL/L (ref 99–110)
CLARITY: ABNORMAL
CO2: 24 MMOL/L (ref 21–32)
COLOR: ABNORMAL
CREAT SERPL-MCNC: <0.5 MG/DL (ref 0.6–1.1)
EOSINOPHILS ABSOLUTE: 0.2 K/UL (ref 0–0.6)
EOSINOPHILS RELATIVE PERCENT: 1.2 %
EPITHELIAL CELLS, UA: 17 /HPF (ref 0–5)
GFR AFRICAN AMERICAN: >60
GFR NON-AFRICAN AMERICAN: >60
GLOBULIN: 3.3 G/DL
GLUCOSE BLD-MCNC: 237 MG/DL (ref 70–99)
GLUCOSE URINE: 100 MG/DL
HBA1C MFR BLD: 8.6 %
HCT VFR BLD CALC: 42.4 % (ref 36–48)
HEMOGLOBIN: 14.7 G/DL (ref 12–16)
KETONES, URINE: ABNORMAL MG/DL
LEUKOCYTE ESTERASE, URINE: NEGATIVE
LYMPHOCYTES ABSOLUTE: 2.3 K/UL (ref 1–5.1)
LYMPHOCYTES RELATIVE PERCENT: 17.9 %
MCH RBC QN AUTO: 30.9 PG (ref 26–34)
MCHC RBC AUTO-ENTMCNC: 34.7 G/DL (ref 31–36)
MCV RBC AUTO: 89.2 FL (ref 80–100)
MICROSCOPIC EXAMINATION: YES
MONOCYTES ABSOLUTE: 1.2 K/UL (ref 0–1.3)
MONOCYTES RELATIVE PERCENT: 9.5 %
NEUTROPHILS ABSOLUTE: 9.3 K/UL (ref 1.7–7.7)
NEUTROPHILS RELATIVE PERCENT: 70.9 %
NITRITE, URINE: NEGATIVE
PDW BLD-RTO: 13.5 % (ref 12.4–15.4)
PH UA: 6 (ref 5–8)
PLATELET # BLD: 234 K/UL (ref 135–450)
PMV BLD AUTO: 8.3 FL (ref 5–10.5)
POTASSIUM SERPL-SCNC: 3.5 MMOL/L (ref 3.5–5.1)
PROTEIN UA: 30 MG/DL
RBC # BLD: 4.75 M/UL (ref 4–5.2)
RBC UA: ABNORMAL /HPF (ref 0–2)
SODIUM BLD-SCNC: 141 MMOL/L (ref 136–145)
SPECIFIC GRAVITY UA: >1.03 (ref 1–1.03)
TOTAL PROTEIN: 7.1 G/DL (ref 6.4–8.2)
UROBILINOGEN, URINE: 1 E.U./DL
WBC # BLD: 13.1 K/UL (ref 4–11)
WBC UA: 5 /HPF (ref 0–5)

## 2019-05-07 PROCEDURE — G8417 CALC BMI ABV UP PARAM F/U: HCPCS | Performed by: INTERNAL MEDICINE

## 2019-05-07 PROCEDURE — 1036F TOBACCO NON-USER: CPT | Performed by: INTERNAL MEDICINE

## 2019-05-07 PROCEDURE — 99214 OFFICE O/P EST MOD 30 MIN: CPT | Performed by: INTERNAL MEDICINE

## 2019-05-07 PROCEDURE — 3045F PR MOST RECENT HEMOGLOBIN A1C LEVEL 7.0-9.0%: CPT | Performed by: INTERNAL MEDICINE

## 2019-05-07 PROCEDURE — 3017F COLORECTAL CA SCREEN DOC REV: CPT | Performed by: INTERNAL MEDICINE

## 2019-05-07 PROCEDURE — G8427 DOCREV CUR MEDS BY ELIG CLIN: HCPCS | Performed by: INTERNAL MEDICINE

## 2019-05-07 PROCEDURE — 83036 HEMOGLOBIN GLYCOSYLATED A1C: CPT | Performed by: INTERNAL MEDICINE

## 2019-05-07 PROCEDURE — 2022F DILAT RTA XM EVC RTNOPTHY: CPT | Performed by: INTERNAL MEDICINE

## 2019-05-07 PROCEDURE — 36415 COLL VENOUS BLD VENIPUNCTURE: CPT | Performed by: INTERNAL MEDICINE

## 2019-05-07 RX ORDER — ONDANSETRON 4 MG/1
4 TABLET, FILM COATED ORAL DAILY PRN
Qty: 30 TABLET | Refills: 0 | Status: SHIPPED | OUTPATIENT
Start: 2019-05-07 | End: 2020-08-18

## 2019-05-07 NOTE — TELEPHONE ENCOUNTER
Dr. Radha Post and weighs correct an ultrasound was done but the splenic artery could not be visualized nor the aneurysm so the patient was not charged. However that she should follow up with another CT scan to check on the splenic artery aneurysm and Dr. Radha Post said she would let the patient know and put the order in.

## 2019-05-07 NOTE — PROGRESS NOTES
zofran      HPI: Scott Tenstrike presents for nausea. Chronic health issues are Ménière's, diabetes, BMI 38, colonic polyp, splenic aneurysm     Post prandial nausea x 2 months. No vomiting. No change in loose stools. No regurgitation. Eating ice chips. Regurgitation in the past. No tobacco diabetes uncontrolled. Denies any visual changes. Ménière's unchanged. No trauma. No falls. No change in stools. Feels better if she doesn't eat. No abdominal pain. Positive splenic aneurysm and umbilical hernia not incarcerated. No abdominal pain. Is not checking her sugars. No polyuria. Symptoms are persistent. Has tried ice change in diet without improvement. Currently on metformin and victoza however was on this quite some time for Neisseria occurred. Takes Nexium daily. Denies any GE reflux.      DM 2. A1c 7. 2019. No retinopathy . Negative microalbumin. Taking ARB and statin. . Uses victoza glipizide and metformin. Check sugars are following diet. Not exercising. No history DKA      Ménière's with hearing loss and occasional vertigo.         no abnormal pap. No STD concerns. No fh ovarian or breast cancer in family, pap  [facility]. utd with mammograms. Tubal pregnancy.      CT of the abdomen with  able splenic aneurysm. Recheck due in 10 of 2018. Didn't follow with vascular surgery however I cannot find these results.      Colonoscopy due May 2023         PMH:    Cholecystectomy    Childbirth    Hx tubal pregnancy laparotomy      SH:  daughter 16 at home  . Tobacco cessation 60 packyears discontinued 20 yr ago. Rare alcohol. Rare sex. Dyspareunia. No drugs     FH: 2 sisters 1 brother    + DM,MGF suicide, dementia in 66's     - colon, breast, ovarina cancer.     Review of systems: Remote concussion. Cataract. No chronic sinus symptoms. Denies any wheezing pneumonias or abnormal chest x-rays. Denies emphysema. No chest pain palpitations or syncope. No breast masses.  Denies any GE reflux or bloody stools. No kidney stones or recurrent bladder infections. Rare knee pain.  Tinea of the toes. Dyspareunia    Constitutional, ent, CV, respiratory, GI, , joint, skin, allergic and psychiatric ROS reviewed and negative except for above    Allergies   Allergen Reactions    Lisinopril      Cough/scratchy throat       Outpatient Medications Marked as Taking for the 19 encounter (Office Visit) with Jenaro Fuentes MD   Medication Sig Dispense Refill    Liraglutide (VICTOZA) 18 MG/3ML SOPN SC injection DIAL AND INJECT SUBCUTANEOUSLY 1.8MG DAILY 9 pen 3    glipiZIDE (GLUCOTROL XL) 10 MG extended release tablet 2 po q day 180 tablet 1    metFORMIN (GLUCOPHAGE) 1000 MG tablet TAKE ONE TABLET BY MOUTH TWICE A DAY WITH MEALS 180 tablet 1    simvastatin (ZOCOR) 20 MG tablet TAKE ONE TABLET BY MOUTH EVERY NIGHT AT BEDTIME 90 tablet 1    levothyroxine (SYNTHROID) 125 MCG tablet TAKE ONE TABLET BY MOUTH DAILY 90 tablet 1    Biotin (BIOTIN 5000) 5 MG CAPS Take by mouth      vitamin D (CHOLECALCIFEROL) 1000 UNIT TABS tablet Take 1,000 Units by mouth daily      Esomeprazole Magnesium (NEXIUM PO) Take by mouth daily               Past Medical History:   Diagnosis Date    GERD (gastroesophageal reflux disease)     History of colon polyps 2019    HYPERPLASTIC POLYP     Hyperlipidemia     Hypothyroidism     Type II or unspecified type diabetes mellitus without mention of complication, not stated as uncontrolled        Past Surgical History:   Procedure Laterality Date     SECTION      x 4    CHOLECYSTECTOMY  10/12/2011    Dr. Claudia Ortega performed              Family History   Problem Relation Age of Onset    Diabetes Father     High Blood Pressure Father     Heart Attack Paternal Grandfather            Review of Systems        Objective     BP (!) 134/92   Pulse 105   Resp 18   Ht 5' 3\" (1.6 m)   Wt 208 lb (94.3 kg)   LMP 2013   SpO2 96% Comment: RA  BMI 36.85 kg/m² 09/10/2018    LABVLDL 51 05/06/2017    LABVLDL 44 05/13/2016     Lab Results   Component Value Date    LABA1C 8.6 05/07/2019     Lab Results   Component Value Date    .4 08/17/2017         Old labs and records reviewed or requested  Discussed past lab and studies with patient      Diagnosis Orders   1. Uncontrolled type 2 diabetes mellitus with hyperglycemia (HCC)  POCT glycosylated hemoglobin (Hb A1C)    HM DIABETES FOOT EXAM    POCT Urine with Microscopic    Beta-Hydroxybutyrate   2. Pure hypercholesterolemia     3. Hypothyroidism due to acquired atrophy of thyroid     4. Meniere's disease, unspecified laterality     5. Umbilical hernia without obstruction and without gangrene     6. Nausea  CBC Auto Differential    Amylase    Comprehensive Metabolic Panel    BLOOD OCCULT STOOL SCREEN #1       Elevated A1c. We'll check for ketones. Increase glipizide most likely. Consider insulin. Patient is to check sugars. 2 weeks of nausea. History splenic aneurysm and possible gastroparesis. We'll get laboratories. Further studies depending on these results. Obesity. Has lost 8 pounds with her anorexia. History GE reflux continue on with her Nexium. Followed by phone in 1-2 days. Emailed vascular concerning for splenic aneurysm follow-up. Diagnosis and treatment discussed.   Possible side effects of medication reviewed  Patients questions answered  Follow up understood  Pt aware if they are not contacted about any test results , this does not mean they are normal.  They should call

## 2019-05-08 ENCOUNTER — TELEPHONE (OUTPATIENT)
Dept: FAMILY MEDICINE CLINIC | Age: 59
End: 2019-05-08

## 2019-05-08 NOTE — TELEPHONE ENCOUNTER
PT called for labs, I gave all info available. \"Pancrease test good. White count up a bit as well as liver test. Sugar 240.  Check sugars.  Increase glipizide to 3 pills  Spoke with specialist and have ordered your CT test. Call for asap apt\"

## 2019-05-31 NOTE — TELEPHONE ENCOUNTER
Pt called in stating that she needs test strips and meter. Pt did not know the name of what strips or meter she used. Please send to the [facility] in [place] on [address].

## 2019-06-03 RX ORDER — GLUCOSAMINE HCL/CHONDROITIN SU 500-400 MG
CAPSULE ORAL
Qty: 100 STRIP | Refills: 0 | Status: SHIPPED | OUTPATIENT
Start: 2019-06-03

## 2019-08-12 ENCOUNTER — OFFICE VISIT (OUTPATIENT)
Dept: FAMILY MEDICINE CLINIC | Age: 59
End: 2019-08-12
Payer: COMMERCIAL

## 2019-08-12 VITALS
OXYGEN SATURATION: 95 % | HEART RATE: 93 BPM | SYSTOLIC BLOOD PRESSURE: 131 MMHG | BODY MASS INDEX: 37.92 KG/M2 | HEIGHT: 63 IN | RESPIRATION RATE: 16 BRPM | WEIGHT: 214 LBS | DIASTOLIC BLOOD PRESSURE: 87 MMHG

## 2019-08-12 DIAGNOSIS — E11.65 UNCONTROLLED TYPE 2 DIABETES MELLITUS WITH HYPERGLYCEMIA (HCC): Primary | ICD-10-CM

## 2019-08-12 DIAGNOSIS — E78.00 PURE HYPERCHOLESTEROLEMIA: ICD-10-CM

## 2019-08-12 DIAGNOSIS — E03.4 HYPOTHYROIDISM DUE TO ACQUIRED ATROPHY OF THYROID: ICD-10-CM

## 2019-08-12 DIAGNOSIS — K21.9 GASTROESOPHAGEAL REFLUX DISEASE WITHOUT ESOPHAGITIS: ICD-10-CM

## 2019-08-12 LAB
HBA1C MFR BLD: 7.8 %
TSH REFLEX FT4: 1.36 UIU/ML (ref 0.27–4.2)

## 2019-08-12 PROCEDURE — 99214 OFFICE O/P EST MOD 30 MIN: CPT | Performed by: INTERNAL MEDICINE

## 2019-08-12 PROCEDURE — 83036 HEMOGLOBIN GLYCOSYLATED A1C: CPT | Performed by: INTERNAL MEDICINE

## 2019-08-12 PROCEDURE — G8427 DOCREV CUR MEDS BY ELIG CLIN: HCPCS | Performed by: INTERNAL MEDICINE

## 2019-08-12 PROCEDURE — 36415 COLL VENOUS BLD VENIPUNCTURE: CPT | Performed by: INTERNAL MEDICINE

## 2019-08-12 PROCEDURE — 3045F PR MOST RECENT HEMOGLOBIN A1C LEVEL 7.0-9.0%: CPT | Performed by: INTERNAL MEDICINE

## 2019-08-12 PROCEDURE — G8417 CALC BMI ABV UP PARAM F/U: HCPCS | Performed by: INTERNAL MEDICINE

## 2019-08-12 PROCEDURE — 3017F COLORECTAL CA SCREEN DOC REV: CPT | Performed by: INTERNAL MEDICINE

## 2019-08-12 PROCEDURE — 2022F DILAT RTA XM EVC RTNOPTHY: CPT | Performed by: INTERNAL MEDICINE

## 2019-08-12 PROCEDURE — 1036F TOBACCO NON-USER: CPT | Performed by: INTERNAL MEDICINE

## 2019-08-12 NOTE — PATIENT INSTRUCTIONS
blurred vision, headache, and tiredness. Blood sugar levels can be affected by stress, illness, surgery, exercise, alcohol use, or skipping meals. Ask your doctor before changing your dose or medication schedule. This medicine can affect the results of certain medical tests. Tell any doctor who treats you that you are using empagliflozin. Empagliflozin is only part of a treatment program that may also include diet, exercise, weight control, blood sugar testing, and special medical care. Follow your doctor's instructions very closely. Store at room temperature away from moisture and heat. What happens if I miss a dose? Take the medicine as soon as you can, but skip the missed dose if it is almost time for your next dose. Do not take two doses at one time. What happens if I overdose? Seek emergency medical attention or call the Poison Help line at [phone]. What should I avoid while taking empagliflozin? Avoid getting up too fast from a sitting or lying position, or you may feel dizzy. What are the possible side effects of empagliflozin? Get emergency medical help if you have signs of an allergic reaction: hives; difficult breathing; swelling of your face, lips, tongue, or throat. Seek medical attention right away if you have signs of a genital infection (penis or vagina): burning, itching, odor, discharge, pain, tenderness, redness or swelling of the genital or rectal area, fever, not feeling well. These symptoms may get worse quickly. Call your doctor at once if you have:  · little or no urination;  · dehydration symptoms --dizziness, weakness, feeling light-headed (like you might pass out);  · ketoacidosis (too much acid in the blood) --nausea, vomiting, stomach pain, confusion, unusual drowsiness, or trouble breathing; or  · signs of a bladder infection --pain or burning when you urinate, increased urination, blood in your urine, fever, pain in your pelvis or back.   Older adults may be more to indicate that the drug or drug combination is safe, effective or appropriate for any given patient. [facility] does not assume any responsibility for any aspect of healthcare administered with the aid of information [facility] provides. The information contained herein is not intended to cover all possible uses, directions, precautions, warnings, drug interactions, allergic reactions, or adverse effects. If you have questions about the drugs you are taking, check with your doctor, nurse or pharmacist.  Copyright 8824-4544 [address]. Version: 3.01. Revision date: 8/30/2018. Care instructions adapted under license by [facility] ([facility]). If you have questions about a medical condition or this instruction, always ask your healthcare professional. [place] [address] any warranty or liability for your use of this information.

## 2019-08-12 NOTE — PROGRESS NOTES
TABLET BY MOUTH DAILY 90 tablet 0    simvastatin (ZOCOR) 20 MG tablet TAKE ONE TABLET BY MOUTH EVERY NIGHT AT BEDTIME 90 tablet 0    Liraglutide (VICTOZA) 18 MG/3ML SOPN SC injection DIAL AND INJECT SUBCUTANEOUSLY 1.8MG DAILY 9 pen 3    glipiZIDE (GLUCOTROL XL) 10 MG extended release tablet 2 po q day 180 tablet 1    metFORMIN (GLUCOPHAGE) 1000 MG tablet TAKE ONE TABLET BY MOUTH TWICE A DAY WITH MEALS 180 tablet 1    vitamin D (CHOLECALCIFEROL) 1000 UNIT TABS tablet Take 1,000 Units by mouth daily               Past Medical History:   Diagnosis Date    GERD (gastroesophageal reflux disease)     History of colon polyps 2019    HYPERPLASTIC POLYP     Hyperlipidemia     Hypothyroidism     Type II or unspecified type diabetes mellitus without mention of complication, not stated as uncontrolled        Past Surgical History:   Procedure Laterality Date     SECTION      x 4    CHOLECYSTECTOMY  10/12/2011    Dr. Suly Jaime performed              Family History   Problem Relation Age of Onset    Diabetes Father     High Blood Pressure Father     Heart Attack Paternal Grandfather            Review of Systems      Objective     /87   Pulse 93   Resp 16   Ht 5' 3\" (1.6 m)   Wt 214 lb (97.1 kg)   LMP 2013   SpO2 95% Comment: RA  BMI 37.91 kg/m²     @LASTSAO2(3)@    Wt Readings from Last 3 Encounters:   19 214 lb (97.1 kg)   19 208 lb (94.3 kg)   19 213 lb 2 oz (96.7 kg)       Physical Exam     NAD alert and cooperative  HEENT: Serous otitis right. Left scarring tympanic membrane. Throat is clear. No adenopathy. Lungs are clear. Good JORGE LUIS ratio without any wheezes rales or rhonchi. Cardiovascular exam regular rate and rhythm without any murmur or click. No hepatosplenomegaly. Umbilical hernia. No rebound or mass. Good range of motion the hips and the knees. Minimal crepitus. Good pulses lower extremities. Sensation to monofilament intact.   No maceration or callus. No suspicious skin lesions or nodules    Chemistry        Component Value Date/Time     05/07/2019 1034    K 3.5 05/07/2019 1034     05/07/2019 1034    CO2 24 05/07/2019 1034    BUN 7 05/07/2019 1034    CREATININE <0.5 (L) 05/07/2019 1034        Component Value Date/Time    CALCIUM 9.4 05/07/2019 1034    ALKPHOS 148 (H) 05/07/2019 1034    AST 18 05/07/2019 1034    ALT 16 05/07/2019 1034    BILITOT 1.2 (H) 05/07/2019 1034            Lab Results   Component Value Date    WBC 13.1 (H) 05/07/2019    HGB 14.7 05/07/2019    HCT 42.4 05/07/2019    MCV 89.2 05/07/2019     05/07/2019     Lab Results   Component Value Date    LABA1C 8.6 05/07/2019     Lab Results   Component Value Date    .4 08/17/2017     Lab Results   Component Value Date    LABA1C 8.6 05/07/2019     No components found for: CHLPL  Lab Results   Component Value Date    TRIG 256 (H) 05/06/2017    TRIG 221 (H) 05/13/2016    TRIG 265 (H) 08/31/2015     Lab Results   Component Value Date    HDL 38 (L) 09/10/2018    HDL 36 (L) 05/06/2017    HDL 37 (L) 05/13/2016     Lab Results   Component Value Date    LDLCALC 82 09/10/2018    LDLCALC 93 05/06/2017    LDLCALC 93 05/13/2016     Lab Results   Component Value Date    LABVLDL 42 09/10/2018    LABVLDL 51 05/06/2017    LABVLDL 44 05/13/2016       Old labs and records reviewed or requested  Discussed past lab and studies with patient     Lab Results   Component Value Date    LABA1C 7.8 08/12/2019     Lab Results   Component Value Date    .4 08/17/2017          Diagnosis Orders   1. Uncontrolled type 2 diabetes mellitus with hyperglycemia (HCC)  POCT glycosylated hemoglobin (Hb A1C)   2. Gastroesophageal reflux disease without esophagitis     3. Pure hypercholesterolemia     4. Hypothyroidism due to acquired atrophy of thyroid  TSH WITH REFLEX TO FT4       A1c improved but still elevated. Will try and take her medicine twice a day.   Declines ER medicine at this

## 2019-08-13 DIAGNOSIS — E03.4 HYPOTHYROIDISM DUE TO ACQUIRED ATROPHY OF THYROID: ICD-10-CM

## 2019-08-13 RX ORDER — LEVOTHYROXINE SODIUM 0.12 MG/1
TABLET ORAL
Qty: 90 TABLET | Refills: 3 | Status: SHIPPED | OUTPATIENT
Start: 2019-08-13 | End: 2020-08-19

## 2019-08-19 ENCOUNTER — TELEPHONE (OUTPATIENT)
Dept: FAMILY MEDICINE CLINIC | Age: 59
End: 2019-08-19

## 2019-08-19 DIAGNOSIS — R42 VERTIGO: Primary | ICD-10-CM

## 2019-08-27 ENCOUNTER — OFFICE VISIT (OUTPATIENT)
Dept: ENT CLINIC | Age: 59
End: 2019-08-27
Payer: COMMERCIAL

## 2019-08-27 VITALS
TEMPERATURE: 98.2 F | BODY MASS INDEX: 37.92 KG/M2 | HEIGHT: 63 IN | DIASTOLIC BLOOD PRESSURE: 84 MMHG | WEIGHT: 214 LBS | HEART RATE: 88 BPM | SYSTOLIC BLOOD PRESSURE: 124 MMHG

## 2019-08-27 DIAGNOSIS — H81.319 AUDITORY VERTIGO, UNSPECIFIED LATERALITY: Primary | ICD-10-CM

## 2019-08-27 DIAGNOSIS — H81.11 BENIGN PAROXYSMAL POSITIONAL VERTIGO OF RIGHT EAR: ICD-10-CM

## 2019-08-27 PROCEDURE — G8427 DOCREV CUR MEDS BY ELIG CLIN: HCPCS | Performed by: OTOLARYNGOLOGY

## 2019-08-27 PROCEDURE — 99243 OFF/OP CNSLTJ NEW/EST LOW 30: CPT | Performed by: OTOLARYNGOLOGY

## 2019-08-27 PROCEDURE — G8417 CALC BMI ABV UP PARAM F/U: HCPCS | Performed by: OTOLARYNGOLOGY

## 2019-09-18 RX ORDER — LIRAGLUTIDE 6 MG/ML
INJECTION SUBCUTANEOUS
Qty: 9 PEN | Refills: 0 | Status: SHIPPED | OUTPATIENT
Start: 2019-09-18 | End: 2019-10-23 | Stop reason: SDUPTHER

## 2019-10-24 RX ORDER — LIRAGLUTIDE 6 MG/ML
INJECTION SUBCUTANEOUS
Qty: 9 PEN | Refills: 0 | Status: SHIPPED | OUTPATIENT
Start: 2019-10-24 | End: 2020-01-27

## 2019-10-24 RX ORDER — GLIPIZIDE 10 MG/1
TABLET, FILM COATED, EXTENDED RELEASE ORAL
Qty: 180 TABLET | Refills: 0 | Status: SHIPPED | OUTPATIENT
Start: 2019-10-24 | End: 2020-01-31

## 2019-10-30 ENCOUNTER — TELEPHONE (OUTPATIENT)
Dept: FAMILY MEDICINE CLINIC | Age: 59
End: 2019-10-30

## 2019-10-30 ENCOUNTER — OFFICE VISIT (OUTPATIENT)
Dept: FAMILY MEDICINE CLINIC | Age: 59
End: 2019-10-30
Payer: COMMERCIAL

## 2019-10-30 VITALS
DIASTOLIC BLOOD PRESSURE: 71 MMHG | HEIGHT: 63 IN | BODY MASS INDEX: 37.74 KG/M2 | RESPIRATION RATE: 16 BRPM | HEART RATE: 82 BPM | WEIGHT: 213 LBS | OXYGEN SATURATION: 97 % | SYSTOLIC BLOOD PRESSURE: 140 MMHG

## 2019-10-30 DIAGNOSIS — E11.65 UNCONTROLLED TYPE 2 DIABETES MELLITUS WITH HYPERGLYCEMIA (HCC): Primary | ICD-10-CM

## 2019-10-30 DIAGNOSIS — Z23 NEED FOR INFLUENZA VACCINATION: ICD-10-CM

## 2019-10-30 DIAGNOSIS — R30.0 DYSURIA: ICD-10-CM

## 2019-10-30 DIAGNOSIS — N94.10 DYSPAREUNIA, FEMALE: ICD-10-CM

## 2019-10-30 LAB
BACTERIA: ABNORMAL /HPF
BILIRUBIN URINE: NEGATIVE
BLOOD, URINE: NEGATIVE
CLARITY: ABNORMAL
COLOR: ABNORMAL
EPITHELIAL CELLS, UA: 4 /HPF (ref 0–5)
GLUCOSE URINE: 100 MG/DL
HBA1C MFR BLD: 7.8 %
KETONES, URINE: NEGATIVE MG/DL
LEUKOCYTE ESTERASE, URINE: ABNORMAL
MICROSCOPIC EXAMINATION: YES
NITRITE, URINE: POSITIVE
PH UA: 5.5 (ref 5–8)
PROTEIN UA: ABNORMAL MG/DL
RBC UA: 2 /HPF (ref 0–4)
SPECIFIC GRAVITY UA: 1.03 (ref 1–1.03)
URINE TYPE: ABNORMAL
UROBILINOGEN, URINE: 0.2 E.U./DL
WBC UA: 35 /HPF (ref 0–5)

## 2019-10-30 PROCEDURE — 1036F TOBACCO NON-USER: CPT | Performed by: INTERNAL MEDICINE

## 2019-10-30 PROCEDURE — 99214 OFFICE O/P EST MOD 30 MIN: CPT | Performed by: INTERNAL MEDICINE

## 2019-10-30 PROCEDURE — G8482 FLU IMMUNIZE ORDER/ADMIN: HCPCS | Performed by: INTERNAL MEDICINE

## 2019-10-30 PROCEDURE — 90686 IIV4 VACC NO PRSV 0.5 ML IM: CPT | Performed by: INTERNAL MEDICINE

## 2019-10-30 PROCEDURE — 90471 IMMUNIZATION ADMIN: CPT | Performed by: INTERNAL MEDICINE

## 2019-10-30 PROCEDURE — G8427 DOCREV CUR MEDS BY ELIG CLIN: HCPCS | Performed by: INTERNAL MEDICINE

## 2019-10-30 PROCEDURE — 83036 HEMOGLOBIN GLYCOSYLATED A1C: CPT | Performed by: INTERNAL MEDICINE

## 2019-10-30 PROCEDURE — 3017F COLORECTAL CA SCREEN DOC REV: CPT | Performed by: INTERNAL MEDICINE

## 2019-10-30 PROCEDURE — G8417 CALC BMI ABV UP PARAM F/U: HCPCS | Performed by: INTERNAL MEDICINE

## 2019-10-30 PROCEDURE — 2022F DILAT RTA XM EVC RTNOPTHY: CPT | Performed by: INTERNAL MEDICINE

## 2019-10-30 RX ORDER — OLMESARTAN MEDOXOMIL 20 MG/1
TABLET ORAL
Qty: 90 TABLET | Refills: 0 | Status: SHIPPED | OUTPATIENT
Start: 2019-10-30 | End: 2020-01-31

## 2019-10-30 RX ORDER — CIPROFLOXACIN 250 MG/1
250 TABLET, FILM COATED ORAL 2 TIMES DAILY
Qty: 14 TABLET | Refills: 0 | Status: SHIPPED | OUTPATIENT
Start: 2019-10-30 | End: 2019-11-06

## 2019-11-02 LAB
ORGANISM: ABNORMAL
URINE CULTURE, ROUTINE: ABNORMAL

## 2019-11-07 DIAGNOSIS — E78.00 PURE HYPERCHOLESTEROLEMIA: ICD-10-CM

## 2019-11-07 RX ORDER — SIMVASTATIN 20 MG
TABLET ORAL
Qty: 90 TABLET | Refills: 0 | Status: SHIPPED | OUTPATIENT
Start: 2019-11-07 | End: 2020-01-31

## 2020-01-27 RX ORDER — LIRAGLUTIDE 6 MG/ML
INJECTION SUBCUTANEOUS
Qty: 27 PEN | Refills: 0 | Status: SHIPPED | OUTPATIENT
Start: 2020-01-27 | End: 2020-05-01

## 2020-01-31 RX ORDER — GLIPIZIDE 10 MG/1
TABLET, FILM COATED, EXTENDED RELEASE ORAL
Qty: 180 TABLET | Refills: 0 | Status: SHIPPED | OUTPATIENT
Start: 2020-01-31 | End: 2020-02-06

## 2020-01-31 RX ORDER — OLMESARTAN MEDOXOMIL 20 MG/1
TABLET ORAL
Qty: 90 TABLET | Refills: 0 | Status: SHIPPED | OUTPATIENT
Start: 2020-01-31 | End: 2020-05-01

## 2020-01-31 RX ORDER — SIMVASTATIN 20 MG
TABLET ORAL
Qty: 90 TABLET | Refills: 0 | Status: SHIPPED | OUTPATIENT
Start: 2020-01-31 | End: 2020-05-01

## 2020-02-06 ENCOUNTER — OFFICE VISIT (OUTPATIENT)
Dept: FAMILY MEDICINE CLINIC | Age: 60
End: 2020-02-06
Payer: COMMERCIAL

## 2020-02-06 VITALS
WEIGHT: 213 LBS | BODY MASS INDEX: 37.74 KG/M2 | RESPIRATION RATE: 18 BRPM | OXYGEN SATURATION: 95 % | HEART RATE: 92 BPM | DIASTOLIC BLOOD PRESSURE: 80 MMHG | SYSTOLIC BLOOD PRESSURE: 135 MMHG | HEIGHT: 63 IN

## 2020-02-06 LAB
A/G RATIO: 1.6 (ref 1.1–2.2)
ALBUMIN SERPL-MCNC: 4.2 G/DL (ref 3.4–5)
ALP BLD-CCNC: 111 U/L (ref 40–129)
ALT SERPL-CCNC: 41 U/L (ref 10–40)
ANION GAP SERPL CALCULATED.3IONS-SCNC: 17 MMOL/L (ref 3–16)
AST SERPL-CCNC: 43 U/L (ref 15–37)
BILIRUB SERPL-MCNC: 0.8 MG/DL (ref 0–1)
BUN BLDV-MCNC: 9 MG/DL (ref 7–20)
CALCIUM SERPL-MCNC: 9.8 MG/DL (ref 8.3–10.6)
CHLORIDE BLD-SCNC: 101 MMOL/L (ref 99–110)
CHOLESTEROL, TOTAL: 150 MG/DL (ref 0–199)
CO2: 24 MMOL/L (ref 21–32)
CREAT SERPL-MCNC: 0.6 MG/DL (ref 0.6–1.1)
CREATININE URINE: 233.6 MG/DL (ref 28–259)
GFR AFRICAN AMERICAN: >60
GFR NON-AFRICAN AMERICAN: >60
GLOBULIN: 2.6 G/DL
GLUCOSE BLD-MCNC: 255 MG/DL (ref 70–99)
HBA1C MFR BLD: 8.4 %
HDLC SERPL-MCNC: 35 MG/DL (ref 40–60)
LDL CHOLESTEROL CALCULATED: ABNORMAL MG/DL
LDL CHOLESTEROL DIRECT: 74 MG/DL
MICROALBUMIN UR-MCNC: 2.2 MG/DL
MICROALBUMIN/CREAT UR-RTO: 9.4 MG/G (ref 0–30)
POTASSIUM SERPL-SCNC: 4.4 MMOL/L (ref 3.5–5.1)
SODIUM BLD-SCNC: 142 MMOL/L (ref 136–145)
TOTAL CK: 60 U/L (ref 26–192)
TOTAL PROTEIN: 6.8 G/DL (ref 6.4–8.2)
TRIGL SERPL-MCNC: 385 MG/DL (ref 0–150)
VITAMIN D 25-HYDROXY: 28.5 NG/ML
VLDLC SERPL CALC-MCNC: ABNORMAL MG/DL

## 2020-02-06 PROCEDURE — G8427 DOCREV CUR MEDS BY ELIG CLIN: HCPCS | Performed by: INTERNAL MEDICINE

## 2020-02-06 PROCEDURE — G8482 FLU IMMUNIZE ORDER/ADMIN: HCPCS | Performed by: INTERNAL MEDICINE

## 2020-02-06 PROCEDURE — 1036F TOBACCO NON-USER: CPT | Performed by: INTERNAL MEDICINE

## 2020-02-06 PROCEDURE — 2022F DILAT RTA XM EVC RTNOPTHY: CPT | Performed by: INTERNAL MEDICINE

## 2020-02-06 PROCEDURE — 3017F COLORECTAL CA SCREEN DOC REV: CPT | Performed by: INTERNAL MEDICINE

## 2020-02-06 PROCEDURE — 83036 HEMOGLOBIN GLYCOSYLATED A1C: CPT | Performed by: INTERNAL MEDICINE

## 2020-02-06 PROCEDURE — G8417 CALC BMI ABV UP PARAM F/U: HCPCS | Performed by: INTERNAL MEDICINE

## 2020-02-06 PROCEDURE — 99214 OFFICE O/P EST MOD 30 MIN: CPT | Performed by: INTERNAL MEDICINE

## 2020-02-06 PROCEDURE — 36415 COLL VENOUS BLD VENIPUNCTURE: CPT | Performed by: INTERNAL MEDICINE

## 2020-02-06 PROCEDURE — 3052F HG A1C>EQUAL 8.0%<EQUAL 9.0%: CPT | Performed by: INTERNAL MEDICINE

## 2020-02-06 RX ORDER — GLIPIZIDE 10 MG/1
TABLET, FILM COATED, EXTENDED RELEASE ORAL
Qty: 360 TABLET | Refills: 0 | Status: SHIPPED | OUTPATIENT
Start: 2020-05-06 | End: 2020-05-01

## 2020-02-06 ASSESSMENT — PATIENT HEALTH QUESTIONNAIRE - PHQ9
2. FEELING DOWN, DEPRESSED OR HOPELESS: 0
SUM OF ALL RESPONSES TO PHQ QUESTIONS 1-9: 0
1. LITTLE INTEREST OR PLEASURE IN DOING THINGS: 0
SUM OF ALL RESPONSES TO PHQ QUESTIONS 1-9: 0
SUM OF ALL RESPONSES TO PHQ9 QUESTIONS 1 & 2: 0

## 2020-02-06 NOTE — PROGRESS NOTES
HPI: Orin Moreno presents for follow-up diabetes. Chronic health issues are Ménière's, diabetes, BMI 38, colonic polyp, splenic aneurysm, hypothyroidism, hyperlipidemia, BPPV, umbilical hernia.      DM2. A1c 8.6 May 2019. No retinopathy. history proteinuria. Metformin 1000 twice daily and glipizide 20 mg daily. Victoza 1.5. Compliant with medication. Does not follow diet. No foot concerns does not exercise. BMI unchanged at 37. Rarely misses medicine. No diarrhea with metformin. Not check sugar. No low sugars. Hypertension. Echocardiogram  with ejection fraction 60%. Denies any new exercise intolerance. Does not exercise no lower extremity edema no known apnea. Currently back on her Benicar.      Ménière's with hearing loss and occasional vertigo not an issue today       no abnormal pap. No STD concerns. No fh ovarian or breast cancer in family, pap  [place]. utd with mammograms. Tubal pregnancy.      CT of the abdomen with  able splenic aneurysm. Recheck due in 10 of 2018.   Overdue vascular follow-up for splenic aneurysm.      Colonoscopy due May 2024 adenomatous polyp and hemorrhoids     Hypothyroid. Neg nodules 2. good thyroid laboratories 2019         PMH:    Cholecystectomy    Childbirth    Hx tubal pregnancy laparotomy      SH:  daughter 17 at home  . Tobacco cessation 60 pack years discontinued 20 yr ago. Rare alcohol. Rare sex. Dyspareunia. No drugs.      FH: 2 sisters 1 brother    + DM,MGF suicide, dementia in 66's     - colon, breast, ovarina cancer.     Review of systems: Remote concussion. Cataract. No chronic sinus symptoms. Denies any wheezing pneumonias or abnormal chest x-rays.  Vertigo.  Denies emphysema. No chest pain palpitations or syncope. No breast masses. Denies any GE reflux or bloody stools. No kidney stones or recurrent bladder infections. Rare knee pain.  Tinea of the toes. Dyspareunia          Constitutional, ent, CV, respiratory, GI, , joint, skin, allergic and psychiatric ROS reviewed and negative except for above    Allergies   Allergen Reactions    Lisinopril      Cough/scratchy throat       Outpatient Medications Marked as Taking for the 20 encounter (Office Visit) with Britney Shirley MD   Medication Sig Dispense Refill    [START ON 2020] glipiZIDE (GLUCOTROL XL) 10 MG extended release tablet 3 or 4 po q day 360 tablet 0    olmesartan (BENICAR) 20 MG tablet TAKE ONE TABLET BY MOUTH DAILY FOR KIDNEY PROTECTION AND FOR BLOOD PRESSURE 90 tablet 0    simvastatin (ZOCOR) 20 MG tablet TAKE ONE TABLET BY MOUTH EVERY NIGHT AT BEDTIME 90 tablet 0    metFORMIN (GLUCOPHAGE) 1000 MG tablet TAKE ONE TABLET BY MOUTH TWICE A DAY WITH MEALS 180 tablet 0    VICTOZA 18 MG/3ML SOPN SC injection DIAL AND INJECT SUBCUTANEOUSLY 1.8MG DAILY 27 pen 0    levothyroxine (SYNTHROID) 125 MCG tablet 1 po q day 90 tablet 3    Esomeprazole Magnesium (NEXIUM PO) Take by mouth daily               Past Medical History:   Diagnosis Date    GERD (gastroesophageal reflux disease)     History of colon polyps 2019    HYPERPLASTIC POLYP     Hyperlipidemia     Hypothyroidism     Type II or unspecified type diabetes mellitus without mention of complication, not stated as uncontrolled        Past Surgical History:   Procedure Laterality Date     SECTION      x 4    CHOLECYSTECTOMY  10/12/2011    Dr. Evelyn August performed              Family History   Problem Relation Age of Onset    Diabetes Father     High Blood Pressure Father     Heart Attack Paternal Grandfather            Review of Systems          Objective     /80   Pulse 92   Resp 18   Ht 5' 3\" (1.6 m)   Wt 213 lb (96.6 kg)   LMP 2013   SpO2 95% Comment: RA  BMI 37.73 kg/m²     @LASTSAO2(3)@    Wt Readings from Last 3 Encounters:   20 213 lb (96.6 kg)   10/30/19 213 lb (96.6 kg)   19 214 lb (97.1 kg)       Physical Exam     NAD alert and cooperative  HEENT: Mild hypopharynx. Fair dentition. TMs unremarkable. Nasal salute. Lungs without any wheezes rales or rhonchi. Decreased breath sounds. Cardiovascular exam regular rate and rhythm without any murmur click. Abdomen is obese. No hepatosplenomegaly noted. No ascites or point tenderness. Diminished but present pulses lower extremities. No maceration. Increased scale. No bony abnormality.   No suspicious skin lesions or nodules      Chemistry        Component Value Date/Time     05/07/2019 1034    K 3.5 05/07/2019 1034     05/07/2019 1034    CO2 24 05/07/2019 1034    BUN 7 05/07/2019 1034    CREATININE <0.5 (L) 05/07/2019 1034        Component Value Date/Time    CALCIUM 9.4 05/07/2019 1034    ALKPHOS 148 (H) 05/07/2019 1034    AST 18 05/07/2019 1034    ALT 16 05/07/2019 1034    BILITOT 1.2 (H) 05/07/2019 1034            Lab Results   Component Value Date    WBC 13.1 (H) 05/07/2019    HGB 14.7 05/07/2019    HCT 42.4 05/07/2019    MCV 89.2 05/07/2019     05/07/2019     Lab Results   Component Value Date    LABA1C 7.8 10/30/2019     Lab Results   Component Value Date    .4 08/17/2017     Lab Results   Component Value Date    LABA1C 7.8 10/30/2019     No components found for: CHLPL  Lab Results   Component Value Date    TRIG 256 (H) 05/06/2017    TRIG 221 (H) 05/13/2016    TRIG 265 (H) 08/31/2015     Lab Results   Component Value Date    HDL 38 (L) 09/10/2018    HDL 36 (L) 05/06/2017    HDL 37 (L) 05/13/2016     Lab Results   Component Value Date    LDLCALC 82 09/10/2018    LDLCALC 93 05/06/2017    LDLCALC 93 05/13/2016     Lab Results   Component Value Date    LABVLDL 42 09/10/2018    LABVLDL 51 05/06/2017    LABVLDL 44 05/13/2016       Old labs and records reviewed or requested  Discussed past lab and studies with patient     Lab Results   Component Value Date    LABA1C 8.4 02/06/2020     Lab Results   Component Value Date    .4 08/17/2017 Diagnosis Orders   1. Uncontrolled type 2 diabetes mellitus without complication, without long-term current use of insulin (HCC)  POCT glycosylated hemoglobin (Hb A1C)    MICROALBUMIN / CREATININE URINE RATIO    glipiZIDE (GLUCOTROL XL) 10 MG extended release tablet   2. Pure hypercholesterolemia  Lipid Panel   3. Hypothyroidism due to acquired atrophy of thyroid     4. Gastroesophageal reflux disease without esophagitis     5. Splenic artery aneurysm ([address]     6. Proteinuria, unspecified type     7. Essential hypertension  Comprehensive Metabolic Panel   8. Myalgia  CK   9. Vitamin D deficiency  Vitamin D 25 Hydroxy     Uncontrolled diabetes. Will increase her glipizide. Continue other medications. Consider Jardiance. Information given. Check sugars twice daily. Goal fasting less than 150. Continue on with her eye exams. Urine microalbumin. Unsure of motivation. Hyperlipidemia. Lipid profile today. Back on her medicines. Hypothyroidism good replacement. Continue recheck in August 2020. GE reflux controlled with medication. Splenic aneurysm. No symptoms. Reluctant follow back up. Hypertension. Improved control on her Benicar. Recheck next visit. Vitamin D deficiency will check. Myalgias. We will check her CPK hold simvastatin for 2 weeks and recheck possibility of switching to Crestor      Return in about 2 months (around 4/6/2020). Diagnosis and treatment discussed.   Possible side effects of medication reviewed  Patients questions answered  Follow up understood  Pt aware if they are not contacted about any test results , this does not mean they are normal.  They should call

## 2020-02-06 NOTE — PATIENT INSTRUCTIONS
Stop simvastatin for 2 weeks if cramping goes away. Restart after that time and let me know. Increase glipizide to 3 pills daily. If sugars still greater than 150 fasting go up to 4 pills a day. Do not forget about your splenic artery scan  30 minutes exercise daily. Decreased p.o. intake 10 pound weight loss prior to her next visit as recommended. If interested in meeting with the clinical pharmacist about diabetes I can set this up.   Follow-up 2 months for repeat A1c

## 2020-03-23 ENCOUNTER — TELEPHONE (OUTPATIENT)
Dept: FAMILY MEDICINE CLINIC | Age: 60
End: 2020-03-23

## 2020-04-21 ENCOUNTER — VIRTUAL VISIT (OUTPATIENT)
Dept: FAMILY MEDICINE CLINIC | Age: 60
End: 2020-04-21
Payer: COMMERCIAL

## 2020-04-21 PROCEDURE — 99213 OFFICE O/P EST LOW 20 MIN: CPT | Performed by: INTERNAL MEDICINE

## 2020-04-21 RX ORDER — LIRAGLUTIDE 6 MG/ML
INJECTION SUBCUTANEOUS
Qty: 2 PEN | Refills: 0 | OUTPATIENT
Start: 2020-04-21

## 2020-04-21 RX ORDER — OLMESARTAN MEDOXOMIL 20 MG/1
TABLET ORAL
Qty: 90 TABLET | Refills: 0 | OUTPATIENT
Start: 2020-04-21

## 2020-05-01 RX ORDER — OLMESARTAN MEDOXOMIL 20 MG/1
TABLET ORAL
Qty: 90 TABLET | Refills: 0 | Status: SHIPPED | OUTPATIENT
Start: 2020-05-01 | End: 2020-08-03

## 2020-05-01 RX ORDER — GLIPIZIDE 10 MG/1
TABLET, FILM COATED, EXTENDED RELEASE ORAL
Qty: 180 TABLET | Refills: 0 | Status: SHIPPED | OUTPATIENT
Start: 2020-05-01 | End: 2020-06-15 | Stop reason: SDUPTHER

## 2020-05-01 RX ORDER — LIRAGLUTIDE 6 MG/ML
INJECTION SUBCUTANEOUS
Qty: 2 PEN | Refills: 0 | Status: SHIPPED | OUTPATIENT
Start: 2020-05-01 | End: 2020-05-08 | Stop reason: SDUPTHER

## 2020-05-01 RX ORDER — SIMVASTATIN 20 MG
TABLET ORAL
Qty: 90 TABLET | Refills: 0 | Status: SHIPPED | OUTPATIENT
Start: 2020-05-01 | End: 2020-08-03

## 2020-05-11 RX ORDER — LIRAGLUTIDE 6 MG/ML
INJECTION SUBCUTANEOUS
Qty: 27 PEN | Refills: 0 | Status: SHIPPED | OUTPATIENT
Start: 2020-05-11 | End: 2020-06-09 | Stop reason: SDUPTHER

## 2020-06-08 ENCOUNTER — TELEPHONE (OUTPATIENT)
Dept: FAMILY MEDICINE CLINIC | Age: 60
End: 2020-06-08

## 2020-06-08 NOTE — TELEPHONE ENCOUNTER
Dose is usually increased in 1 month, but can stay at low dose x 3 months if desired.   Let me know and will order

## 2020-06-08 NOTE — TELEPHONE ENCOUNTER
PT called in stating that she pays $200/mo for Victoza but would like to know if she can get a 90 day supply because while it will still cost her $200 she can pay it every 3 months versus every month.      Please call PT back: [phone]

## 2020-06-09 ENCOUNTER — TELEPHONE (OUTPATIENT)
Dept: FAMILY MEDICINE CLINIC | Age: 60
End: 2020-06-09

## 2020-06-09 RX ORDER — LIRAGLUTIDE 6 MG/ML
INJECTION SUBCUTANEOUS
Qty: 9 PEN | Refills: 3 | Status: SHIPPED | OUTPATIENT
Start: 2020-06-09 | End: 2020-08-18 | Stop reason: SDUPTHER

## 2020-06-15 ENCOUNTER — OFFICE VISIT (OUTPATIENT)
Dept: FAMILY MEDICINE CLINIC | Age: 60
End: 2020-06-15
Payer: COMMERCIAL

## 2020-06-15 VITALS
TEMPERATURE: 97.4 F | OXYGEN SATURATION: 95 % | SYSTOLIC BLOOD PRESSURE: 120 MMHG | WEIGHT: 213 LBS | DIASTOLIC BLOOD PRESSURE: 70 MMHG | HEIGHT: 63 IN | BODY MASS INDEX: 37.74 KG/M2 | HEART RATE: 102 BPM | RESPIRATION RATE: 16 BRPM

## 2020-06-15 LAB — HBA1C MFR BLD: 8.2 %

## 2020-06-15 PROCEDURE — 99214 OFFICE O/P EST MOD 30 MIN: CPT | Performed by: INTERNAL MEDICINE

## 2020-06-15 PROCEDURE — 1036F TOBACCO NON-USER: CPT | Performed by: INTERNAL MEDICINE

## 2020-06-15 PROCEDURE — 83036 HEMOGLOBIN GLYCOSYLATED A1C: CPT | Performed by: INTERNAL MEDICINE

## 2020-06-15 PROCEDURE — G8417 CALC BMI ABV UP PARAM F/U: HCPCS | Performed by: INTERNAL MEDICINE

## 2020-06-15 PROCEDURE — G8427 DOCREV CUR MEDS BY ELIG CLIN: HCPCS | Performed by: INTERNAL MEDICINE

## 2020-06-15 PROCEDURE — 3017F COLORECTAL CA SCREEN DOC REV: CPT | Performed by: INTERNAL MEDICINE

## 2020-06-15 PROCEDURE — 3052F HG A1C>EQUAL 8.0%<EQUAL 9.0%: CPT | Performed by: INTERNAL MEDICINE

## 2020-06-15 PROCEDURE — 2022F DILAT RTA XM EVC RTNOPTHY: CPT | Performed by: INTERNAL MEDICINE

## 2020-06-15 RX ORDER — GLIPIZIDE 10 MG/1
TABLET, FILM COATED, EXTENDED RELEASE ORAL
Qty: 360 TABLET | Refills: 0 | Status: SHIPPED | OUTPATIENT
Start: 2020-06-15 | End: 2020-06-25 | Stop reason: SDUPTHER

## 2020-06-24 ENCOUNTER — TELEPHONE (OUTPATIENT)
Dept: FAMILY MEDICINE CLINIC | Age: 60
End: 2020-06-24

## 2020-06-25 RX ORDER — GLIPIZIDE 10 MG/1
TABLET, FILM COATED, EXTENDED RELEASE ORAL
Qty: 360 TABLET | Refills: 0 | Status: SHIPPED | OUTPATIENT
Start: 2020-06-25 | End: 2020-08-18

## 2020-08-03 RX ORDER — SIMVASTATIN 20 MG
TABLET ORAL
Qty: 90 TABLET | Refills: 0 | Status: SHIPPED | OUTPATIENT
Start: 2020-08-03 | End: 2020-08-20

## 2020-08-03 RX ORDER — OLMESARTAN MEDOXOMIL 20 MG/1
TABLET ORAL
Qty: 90 TABLET | Refills: 0 | Status: SHIPPED | OUTPATIENT
Start: 2020-08-03 | End: 2020-08-20

## 2020-08-18 ENCOUNTER — OFFICE VISIT (OUTPATIENT)
Dept: FAMILY MEDICINE CLINIC | Age: 60
End: 2020-08-18
Payer: COMMERCIAL

## 2020-08-18 VITALS
HEART RATE: 90 BPM | OXYGEN SATURATION: 97 % | RESPIRATION RATE: 16 BRPM | HEIGHT: 63 IN | WEIGHT: 210 LBS | BODY MASS INDEX: 37.21 KG/M2 | DIASTOLIC BLOOD PRESSURE: 75 MMHG | SYSTOLIC BLOOD PRESSURE: 129 MMHG

## 2020-08-18 LAB — HBA1C MFR BLD: 7.5 %

## 2020-08-18 PROCEDURE — G8417 CALC BMI ABV UP PARAM F/U: HCPCS | Performed by: INTERNAL MEDICINE

## 2020-08-18 PROCEDURE — 3051F HG A1C>EQUAL 7.0%<8.0%: CPT | Performed by: INTERNAL MEDICINE

## 2020-08-18 PROCEDURE — 1036F TOBACCO NON-USER: CPT | Performed by: INTERNAL MEDICINE

## 2020-08-18 PROCEDURE — 36415 COLL VENOUS BLD VENIPUNCTURE: CPT | Performed by: INTERNAL MEDICINE

## 2020-08-18 PROCEDURE — G8427 DOCREV CUR MEDS BY ELIG CLIN: HCPCS | Performed by: INTERNAL MEDICINE

## 2020-08-18 PROCEDURE — 2022F DILAT RTA XM EVC RTNOPTHY: CPT | Performed by: INTERNAL MEDICINE

## 2020-08-18 PROCEDURE — 99214 OFFICE O/P EST MOD 30 MIN: CPT | Performed by: INTERNAL MEDICINE

## 2020-08-18 PROCEDURE — 3017F COLORECTAL CA SCREEN DOC REV: CPT | Performed by: INTERNAL MEDICINE

## 2020-08-18 PROCEDURE — 83036 HEMOGLOBIN GLYCOSYLATED A1C: CPT | Performed by: INTERNAL MEDICINE

## 2020-08-18 RX ORDER — LIRAGLUTIDE 6 MG/ML
INJECTION SUBCUTANEOUS
Qty: 9 PEN | Refills: 3 | Status: SHIPPED | OUTPATIENT
Start: 2020-08-18 | End: 2020-12-10

## 2020-08-18 RX ORDER — LIRAGLUTIDE 6 MG/ML
INJECTION SUBCUTANEOUS
Qty: 9 PEN | Refills: 3 | Status: SHIPPED
Start: 2020-08-18 | End: 2020-08-18 | Stop reason: CLARIF

## 2020-08-18 RX ORDER — GLIPIZIDE 10 MG/1
TABLET ORAL
Qty: 360 TABLET | Refills: 0 | Status: SHIPPED | OUTPATIENT
Start: 2020-08-18 | End: 2021-04-20

## 2020-08-18 RX ORDER — GLIPIZIDE 10 MG/1
TABLET ORAL
Qty: 360 TABLET | Refills: 0 | Status: SHIPPED | OUTPATIENT
Start: 2020-08-18 | End: 2020-08-18

## 2020-08-18 NOTE — PROGRESS NOTES
HPI: Liz Helton presents for follow-up diabetes    Chronic health issues are Ménière's, uncontrolled diabetes, BMI 38, colonic polyp, splenic aneurysm, hypothyroidism, hyperlipidemia, BPPV, umbilical hernia.      DM2.  A1c 8.6 May 2019.  No retinopathy. + proteinuria. Better compliance currently.  Glipizide increased to 40 mg a day.  Taking Victoza 1.8 daily.  Metformin thousand twice daily. States she is cut out on bread and pop.  Down a few pounds. Katie Lis going up and down the steps but no exercise formally.  BMI 37 Myalgias improved with switch to simvastatin from atorvastatin.  Had 1 low sugars up-to-date eye exam.  Denies any peripheral neuropathy. ARB.     Hypertension.  Echocardiogram  with ejection fraction 60%.  Denies any new exercise intolerance.  Active. Recently moved. No lower extremity edema no known apnea.  Currently back on her Benicar 20 .  Not checking blood pressure.  No chest pain palpitations. No orthostasis.     Ménière's with hearing loss and occasional vertigo not an issue today        no abnormal pap. No STD concerns. No fh ovarian or breast cancer in family, pap  [place]. Overdue mammogram and to schedule. .  Tubal pregnancy. Pap smear overdue 2020      CT of the abdomen with  able splenic aneurysm. [facility] follow-up for splenic aneurysm.  Abdominal CT.     Colonoscopy due May 2024 last with adenomatous polyp and hemorrhoids     Hypothyroid. Neg nodules 2. good thyroid laboratories 2019         PMH:    Cholecystectomy    Childbirth    Hx tubal pregnancy laparotomy      SH:  son bought their previous house and daughter living with him. Likes her new home.  . Trenda Dhruv at home secondary to the pandemic.  Enjoys working at home.  Tobacco cessation 60 pack years discontinued 20 yr ago. Rare alcohol. Rare sex. Dyspareunia.  No drugs.      FH: 2 sisters 1 brother    + DM,MGF suicide, dementia in 66's     - colon, breast, ovarina cancer.     Review of systems: Remote concussion. Cataract. No chronic sinus symptoms. Denies any wheezing pneumonias or abnormal chest x-rays.  Vertigo.  Denies emphysema. No chest pain palpitations or syncope. No breast masses. Denies any GE reflux or bloody stools. No kidney stones or recurrent bladder infections. Rare knee pain.  Tinea of the toes. Dyspareunia    Constitutional, ent, CV, respiratory, GI, , joint, skin, allergic and psychiatric ROS reviewed and negative except for above    Allergies   Allergen Reactions    Lisinopril      Cough/scratchy throat       Outpatient Medications Marked as Taking for the 20 encounter (Office Visit) with Suzanna Morse MD   Medication Sig Dispense Refill    glipiZIDE (GLUCOTROL) 10 MG tablet 1-2  before breakfast 2 before supper 360 tablet 0    Liraglutide (VICTOZA) 18 MG/3ML SOPN SC injection DIAL AND INJECT SUBCUTANEOUSLY 1.8MG DAILY 9 pen 3    simvastatin (ZOCOR) 20 MG tablet TAKE ONE TABLET BY MOUTH EVERY NIGHT AT BEDTIME 90 tablet 0    olmesartan (BENICAR) 20 MG tablet TAKE ONE TABLET BY MOUTH DAILY FOR KIDNEY PROTECTION AND FOR BLOOD PRESSURE 90 tablet 0    metFORMIN (GLUCOPHAGE) 1000 MG tablet TAKE ONE TABLET BY MOUTH TWICE A DAY WITH MEALS 180 tablet 0    levothyroxine (SYNTHROID) 125 MCG tablet 1 po q day 90 tablet 3    Biotin (BIOTIN 5000) 5 MG CAPS Take by mouth      vitamin D (CHOLECALCIFEROL) 1000 UNIT TABS tablet Take 1,000 Units by mouth daily      Esomeprazole Magnesium (NEXIUM PO) Take by mouth daily               Past Medical History:   Diagnosis Date    GERD (gastroesophageal reflux disease)     History of colon polyps 2019    HYPERPLASTIC POLYP     Hyperlipidemia     Hypothyroidism     Type II or unspecified type diabetes mellitus without mention of complication, not stated as uncontrolled        Past Surgical History:   Procedure Laterality Date     SECTION      x 4    CHOLECYSTECTOMY  10/12/2011 Dr. Bonnielee Dubin performed              Family History   Problem Relation Age of Onset    Diabetes Father     High Blood Pressure Father     Heart Attack Paternal Grandfather            Review of Systems      Objective     /75   Pulse 90   Resp 16   Ht 5' 3\" (1.6 m)   Wt 210 lb (95.3 kg)   LMP 12/22/2013   SpO2 97% Comment: RA  BMI 37.20 kg/m²     @LASTSAO2(3)@    Wt Readings from Last 3 Encounters:   06/15/20 213 lb (96.6 kg)   02/06/20 213 lb (96.6 kg)   10/30/19 213 lb (96.6 kg)       Physical Exam     NAD alert and cooperative  HEENT: TMs unremarkable. Denture. Throat clear. None crowded hypopharynx. Full neck. No adenopathy. Lungs are clear. No wheezes rales or rhonchi. Cardiovascular exam regular rate and rhythm without any ectopy. No gallop or S3-4. Abdomen obese no hepatosplenomegaly epigastric tenderness or mass. Good range of motion of the hips. No crepitus of the knees. Good pulses feet. Sensation is intact. No bony abnormality or maceration.     Chemistry        Component Value Date/Time     02/06/2020 1637    K 4.4 02/06/2020 1637     02/06/2020 1637    CO2 24 02/06/2020 1637    BUN 9 02/06/2020 1637    CREATININE 0.6 02/06/2020 1637        Component Value Date/Time    CALCIUM 9.8 02/06/2020 1637    ALKPHOS 111 02/06/2020 1637    AST 43 (H) 02/06/2020 1637    ALT 41 (H) 02/06/2020 1637    BILITOT 0.8 02/06/2020 1637            Lab Results   Component Value Date    WBC 13.1 (H) 05/07/2019    HGB 14.7 05/07/2019    HCT 42.4 05/07/2019    MCV 89.2 05/07/2019     05/07/2019     Lab Results   Component Value Date    LABA1C 8.2 06/15/2020     Lab Results   Component Value Date    .4 08/17/2017     Lab Results   Component Value Date    LABA1C 8.2 06/15/2020     No components found for: CHLPL  Lab Results   Component Value Date    TRIG 385 (H) 02/06/2020    TRIG 256 (H) 05/06/2017    TRIG 221 (H) 05/13/2016     Lab Results   Component Value Date    HDL 35 (L) 02/06/2020    HDL 38 (L) 09/10/2018    HDL 36 (L) 05/06/2017     Lab Results   Component Value Date    LDLCALC see below 02/06/2020    [address] 09/10/2018    LDLCALC 93 05/06/2017     Lab Results   Component Value Date    LABVLDL see below 02/06/2020    LABVLDL 42 09/10/2018    LABVLDL 51 05/06/2017       Old labs and records reviewed or requested  Discussed past lab and studies with patient      Diagnosis Orders   1. Uncontrolled type 2 diabetes mellitus with hyperglycemia (HCC)  POCT glycosylated hemoglobin (Hb A1C)    Comprehensive Metabolic Panel    Diabetic Foot Exam   2. Gastroesophageal reflux disease without esophagitis     3. History of colon polyps     4. Hyperlipidemia, unspecified hyperlipidemia type  Comprehensive Metabolic Panel   5. Hypothyroidism due to acquired atrophy of thyroid  TSH WITH REFLEX TO FT4   6. Other proteinuria     7. Umbilical hernia without obstruction and without gangrene     8. Elevated liver enzymes  Comprehensive Metabolic Panel   9. Vitamin D deficiency  Vitamin D 25 Hydroxy   10. Essential hypertension     11. Uncontrolled type 2 diabetes mellitus without complication, without long-term current use of insulin (HCC)  glipiZIDE (GLUCOTROL) 10 MG tablet    Liraglutide (VICTOZA) 18 MG/3ML SOPN SC injection    DISCONTINUED: Liraglutide (VICTOZA) 18 MG/3ML SOPN SC injection       A1c improved. Praise given. Switch to short acting glipizide. 1-2 before breakfast 2 before supper. Follow back up in 3 months. Basic metabolic profile. Hypothyroidism. Thyroid function. No symptoms of over or under replacement. Elevated liver enzymes. Most likely fatty liver. Liver function test today. History of vitamin D deficiency we will check. Hypertension good control on current medications. GE reflux continue on with her PPI intermittently.   May be exacerbated by her Victoza however is doing well with her sugars at this point will continue at this point    History colonic polyps. Recheck in 2024    Return in about 3 months (around 11/18/2020). Diagnosis and treatment discussed.   Possible side effects of medication reviewed  Patients questions answered  Follow up understood  Pt aware if they are not contacted about any test results , this does not mean they are normal.  They should call

## 2020-08-18 NOTE — PATIENT INSTRUCTIONS
Switch glipizide to short acting. 1 before breakfast 2 before supper. If big lunch can use one before lunch  Weight loss,30 min exercise daily  Look forward to a1c less than 7 and q 6 month appointments.   Protect your ears  Don't forget mammogram and pap

## 2020-08-19 ENCOUNTER — TELEPHONE (OUTPATIENT)
Dept: FAMILY MEDICINE CLINIC | Age: 60
End: 2020-08-19

## 2020-08-19 LAB
A/G RATIO: 1.7 (ref 1.1–2.2)
ALBUMIN SERPL-MCNC: 4.1 G/DL (ref 3.4–5)
ALP BLD-CCNC: 94 U/L (ref 40–129)
ALT SERPL-CCNC: 27 U/L (ref 10–40)
ANION GAP SERPL CALCULATED.3IONS-SCNC: 17 MMOL/L (ref 3–16)
AST SERPL-CCNC: 27 U/L (ref 15–37)
BILIRUB SERPL-MCNC: 1 MG/DL (ref 0–1)
BUN BLDV-MCNC: 7 MG/DL (ref 7–20)
CALCIUM SERPL-MCNC: 9 MG/DL (ref 8.3–10.6)
CHLORIDE BLD-SCNC: 106 MMOL/L (ref 99–110)
CO2: 20 MMOL/L (ref 21–32)
CREAT SERPL-MCNC: <0.5 MG/DL (ref 0.6–1.1)
GFR AFRICAN AMERICAN: >60
GFR NON-AFRICAN AMERICAN: >60
GLOBULIN: 2.4 G/DL
GLUCOSE BLD-MCNC: 201 MG/DL (ref 70–99)
POTASSIUM SERPL-SCNC: 4 MMOL/L (ref 3.5–5.1)
SODIUM BLD-SCNC: 143 MMOL/L (ref 136–145)
T4 FREE: 1.7 NG/DL (ref 0.9–1.8)
TOTAL PROTEIN: 6.5 G/DL (ref 6.4–8.2)
TSH REFLEX FT4: 0.12 UIU/ML (ref 0.27–4.2)
VITAMIN D 25-HYDROXY: 32.7 NG/ML

## 2020-08-19 RX ORDER — LEVOTHYROXINE SODIUM 112 UG/1
TABLET ORAL
Qty: 90 TABLET | Refills: 0 | Status: SHIPPED | OUTPATIENT
Start: 2020-08-19 | End: 2020-11-02

## 2020-08-19 NOTE — TELEPHONE ENCOUNTER
[facility] called in regarding PT's prescription for Glipizide. They state in the past PT has taken Glipize XL and they wanted to be sure that this was the correct prescription.      Best call back number: [phone]

## 2020-08-20 ENCOUNTER — TELEPHONE (OUTPATIENT)
Dept: FAMILY MEDICINE CLINIC | Age: 60
End: 2020-08-20

## 2020-09-30 ENCOUNTER — TELEPHONE (OUTPATIENT)
Dept: FAMILY MEDICINE CLINIC | Age: 60
End: 2020-09-30

## 2020-09-30 RX ORDER — MECLIZINE HCL 12.5 MG/1
TABLET ORAL
Qty: 30 TABLET | Refills: 0 | Status: SHIPPED | OUTPATIENT
Start: 2020-09-30 | End: 2022-03-31 | Stop reason: SDUPTHER

## 2020-09-30 NOTE — TELEPHONE ENCOUNTER
PT called in stating that she has meniere's disease and her equilibrium is off and she's experiencing dizziness and it's causing her to walk sideways. Pt would like to know if Dr. Ron Grider can call something into the [facility] in [place] for her.      Best call back number: [phone]

## 2020-12-10 RX ORDER — LIRAGLUTIDE 6 MG/ML
INJECTION SUBCUTANEOUS
Qty: 2 PEN | Refills: 2 | Status: SHIPPED | OUTPATIENT
Start: 2020-12-10 | End: 2020-12-14 | Stop reason: SDUPTHER

## 2020-12-11 ENCOUNTER — NURSE ONLY (OUTPATIENT)
Dept: FAMILY MEDICINE CLINIC | Age: 60
End: 2020-12-11
Payer: COMMERCIAL

## 2020-12-11 LAB — TSH REFLEX FT4: 0.59 UIU/ML (ref 0.27–4.2)

## 2020-12-11 PROCEDURE — 36415 COLL VENOUS BLD VENIPUNCTURE: CPT | Performed by: INTERNAL MEDICINE

## 2020-12-11 NOTE — PROGRESS NOTES
Pt states she has been checking her blood sugars but not writing them down. She states they have been averaging around 200-250. I asked if she would write them down for the next week and call with readings. Pt agreed. Pt also scheduled for February for DM f/u with Dr Yesika Feng.

## 2020-12-13 RX ORDER — LEVOTHYROXINE SODIUM 112 UG/1
TABLET ORAL
Qty: 90 TABLET | Refills: 3 | Status: SHIPPED | OUTPATIENT
Start: 2020-12-13 | End: 2022-02-14

## 2020-12-14 ENCOUNTER — TELEPHONE (OUTPATIENT)
Dept: FAMILY MEDICINE CLINIC | Age: 60
End: 2020-12-14

## 2020-12-14 RX ORDER — LIRAGLUTIDE 6 MG/ML
INJECTION SUBCUTANEOUS
Qty: 3 PEN | Refills: 3 | Status: SHIPPED | OUTPATIENT
Start: 2020-12-14 | End: 2021-03-10

## 2020-12-14 NOTE — TELEPHONE ENCOUNTER
PT requesting 3 pens with 3 refills of the victoza sent in, says 2 pens with 2 refills it costs the same amount.     Lab can not add a1c

## 2021-03-01 ENCOUNTER — OFFICE VISIT (OUTPATIENT)
Dept: FAMILY MEDICINE CLINIC | Age: 61
End: 2021-03-01
Payer: OTHER GOVERNMENT

## 2021-03-01 VITALS
TEMPERATURE: 97.3 F | BODY MASS INDEX: 38.09 KG/M2 | SYSTOLIC BLOOD PRESSURE: 91 MMHG | DIASTOLIC BLOOD PRESSURE: 63 MMHG | RESPIRATION RATE: 16 BRPM | WEIGHT: 215 LBS | OXYGEN SATURATION: 99 % | HEIGHT: 63 IN | HEART RATE: 89 BPM

## 2021-03-01 DIAGNOSIS — Z23 NEED FOR SHINGLES VACCINE: ICD-10-CM

## 2021-03-01 DIAGNOSIS — Z86.010 HISTORY OF COLON POLYPS: ICD-10-CM

## 2021-03-01 DIAGNOSIS — E78.5 HYPERLIPIDEMIA, UNSPECIFIED HYPERLIPIDEMIA TYPE: ICD-10-CM

## 2021-03-01 DIAGNOSIS — E11.65 UNCONTROLLED TYPE 2 DIABETES MELLITUS WITH HYPERGLYCEMIA (HCC): Primary | ICD-10-CM

## 2021-03-01 DIAGNOSIS — Z23 NEED FOR INFLUENZA VACCINATION: ICD-10-CM

## 2021-03-01 DIAGNOSIS — R80.8 OTHER PROTEINURIA: ICD-10-CM

## 2021-03-01 DIAGNOSIS — K21.9 GASTROESOPHAGEAL REFLUX DISEASE WITHOUT ESOPHAGITIS: ICD-10-CM

## 2021-03-01 DIAGNOSIS — E03.4 HYPOTHYROIDISM DUE TO ACQUIRED ATROPHY OF THYROID: ICD-10-CM

## 2021-03-01 LAB
CHOLESTEROL, TOTAL: 162 MG/DL (ref 0–199)
HBA1C MFR BLD: 8.8 %
HDLC SERPL-MCNC: 35 MG/DL (ref 40–60)
LDL CHOLESTEROL CALCULATED: ABNORMAL MG/DL
LDL CHOLESTEROL DIRECT: 78 MG/DL
TRIGL SERPL-MCNC: 357 MG/DL (ref 0–150)
VLDLC SERPL CALC-MCNC: ABNORMAL MG/DL

## 2021-03-01 PROCEDURE — 36415 COLL VENOUS BLD VENIPUNCTURE: CPT | Performed by: INTERNAL MEDICINE

## 2021-03-01 PROCEDURE — 3052F HG A1C>EQUAL 8.0%<EQUAL 9.0%: CPT | Performed by: INTERNAL MEDICINE

## 2021-03-01 PROCEDURE — 90471 IMMUNIZATION ADMIN: CPT | Performed by: INTERNAL MEDICINE

## 2021-03-01 PROCEDURE — 99214 OFFICE O/P EST MOD 30 MIN: CPT | Performed by: INTERNAL MEDICINE

## 2021-03-01 PROCEDURE — 90472 IMMUNIZATION ADMIN EACH ADD: CPT | Performed by: INTERNAL MEDICINE

## 2021-03-01 PROCEDURE — 90686 IIV4 VACC NO PRSV 0.5 ML IM: CPT | Performed by: INTERNAL MEDICINE

## 2021-03-01 PROCEDURE — 83036 HEMOGLOBIN GLYCOSYLATED A1C: CPT | Performed by: INTERNAL MEDICINE

## 2021-03-01 PROCEDURE — 90750 HZV VACC RECOMBINANT IM: CPT | Performed by: INTERNAL MEDICINE

## 2021-03-01 RX ORDER — EMPAGLIFLOZIN 10 MG/1
10 TABLET, FILM COATED ORAL DAILY
Qty: 30 TABLET | Refills: 0 | Status: SHIPPED | OUTPATIENT
Start: 2021-03-01 | End: 2021-06-01 | Stop reason: SDUPTHER

## 2021-03-01 ASSESSMENT — PATIENT HEALTH QUESTIONNAIRE - PHQ9
SUM OF ALL RESPONSES TO PHQ QUESTIONS 1-9: 0
1. LITTLE INTEREST OR PLEASURE IN DOING THINGS: 0

## 2021-03-01 NOTE — PATIENT INSTRUCTIONS
Abbot nutrition   code 154    Stop cranberry juice unless sugar free. Cut back on calories. Exercise 20 min daily. It is recommended that you use a baby asprin    Call in 3 weeks with sugars and will increase jardiance if needed     Mammogram in may. Follow up with your GYN. See you in 3 months for your a1c etc     Patient Education        Starting a Weight Loss Plan: Care Instructions  Your Care Instructions     If you are thinking about losing weight, it can be hard to know where to start. Your doctor can help you set up a weight loss plan that best meets your needs. You may want to take a class on nutrition or exercise, or join a weight loss support group. If you have questions about how to make changes to your eating or exercise habits, ask your doctor about seeing a registered dietitian or an exercise specialist.  It can be a big challenge to lose weight. But you do not have to make huge changes at once. Make small changes, and stick with them. When those changes become habit, add a few more changes. If you do not think you are ready to make changes right now, try to pick a date in the future. Make an appointment to see your doctor to discuss whether the time is right for you to start a plan. Follow-up care is a key part of your treatment and safety. Be sure to make and go to all appointments, and call your doctor if you are having problems. It's also a good idea to know your test results and keep a list of the medicines you take. How can you care for yourself at home? · Set realistic goals. Many people expect to lose much more weight than is likely. A weight loss of 5% to 10% of your body weight may be enough to improve your health. · Get family and friends involved to provide support. Talk to them about why you are trying to lose weight, and ask them to help. They can help by participating in exercise and having meals with you, even if they may be eating something different.   · Find what works best for you. If you do not have time or do not like to cook, a program that offers meal replacement bars or shakes may be better for you. Or if you like to prepare meals, finding a plan that includes daily menus and recipes may be best.  · Ask your doctor about other health professionals who can help you achieve your weight loss goals. ? A dietitian can help you make healthy changes in your diet. ? An exercise specialist or  can help you develop a safe and effective exercise program.  ? A counselor or psychiatrist can help you cope with issues such as depression, anxiety, or family problems that can make it hard to focus on weight loss. · Consider joining a support group for people who are trying to lose weight. Your doctor can suggest groups in your area. Where can you learn more? Go to [email or web address]://[email or web address].[email or web address] org and sign in to your [employer] account. Enter A939 in the BrandMaker box to learn more about \"Starting a Weight Loss Plan: Care Instructions. \"     If you do not have an account, please click on the \"Sign Up Now\" link. Current as of: December 11, 2019               Content Version: 12.6  © 5945-9081 [employer]. Care instructions adapted under license by [facility] ([facility]). If you have questions about a medical condition or this instruction, always ask your healthcare professional. [address] any warranty or liability for your use of this information.

## 2021-03-01 NOTE — PROGRESS NOTES
cancer.     Review of systems: Remote concussion. Cataract. No chronic sinus symptoms. Denies any wheezing pneumonias or abnormal chest x-rays. + Vertigo.  Denies emphysema 60-pack-year tobacco.. No chest pain palpitations or syncope. No breast masses. Denies any GE reflux or bloody stools. No kidney stones or recurrent bladder infections. Rare knee pain.  Tinea of the toes. Dyspareunia    Constitutional, ent, CV, respiratory, GI, , joint, skin, allergic and psychiatric ROS reviewed and negative except for above    Allergies   Allergen Reactions    Lisinopril      Cough/scratchy throat       Outpatient Medications Marked as Taking for the 3/1/21 encounter (Office Visit) with Kay Still MD   Medication Sig Dispense Refill    metFORMIN (GLUCOPHAGE) 1000 MG tablet TAKE ONE TABLET BY MOUTH TWICE A DAY WITH MEALS 180 tablet 0    Liraglutide (VICTOZA) 18 MG/3ML SOPN SC injection DIAL AND INJECT UNDER THE SKIN 1.8 MG DAILY 3 pen 3    levothyroxine (SYNTHROID) 112 MCG tablet TAKE ONE TABLET BY MOUTH DAILY 90 tablet 3    simvastatin (ZOCOR) 20 MG tablet TAKE ONE TABLET BY MOUTH EVERY NIGHT AT BEDTIME 90 tablet 1    olmesartan (BENICAR) 20 MG tablet TAKE ONE TABLET BY MOUTH DAILY 90 tablet 1    glipiZIDE (GLUCOTROL) 10 MG tablet 1-2  before breakfast 2 before supper 360 tablet 0             Past Medical History:   Diagnosis Date    GERD (gastroesophageal reflux disease)     History of colon polyps 2019    HYPERPLASTIC POLYP     Hyperlipidemia     Hypothyroidism     Type II or unspecified type diabetes mellitus without mention of complication, not stated as uncontrolled        Past Surgical History:   Procedure Laterality Date     SECTION      x 4    CHOLECYSTECTOMY  10/12/2011    Dr. Saqib Salvador performed              Family History   Problem Relation Age of Onset    Diabetes Father     High Blood Pressure Father     Heart Attack Paternal Grandfather      BP 91/63   Pulse 89   Temp 97.3 °F (36.3 °C)   Resp 16   Ht 5' 3\" (1.6 m)   Wt 215 lb (97.5 kg)   LMP 12/22/2013   SpO2 99% Comment: RA  BMI 38.09 kg/m²     HEENT edentulous. Throat is clear tonsillectomy. Tympanic membrane scar on left. No wax. No nystagmus. Full neck. I detect no nodules or adenopathy no stridor. Lungs are clear. Good JORGE LUIS ratio without any wheezes rales or rhonchi. Cardiovascular exam regular rate and rhythm no murmur click. Pendulous breast without any dimpling mass or discharge. No hepatosplenomegaly epigastric tenderness or mass. Good pulses feet. Sensation is intact. No callus or maceration. No suspicious skin lesions or nodules.         Chemistry        Component Value Date/Time     08/18/2020 1627    K 4.0 08/18/2020 1627     08/18/2020 1627    CO2 20 (L) 08/18/2020 1627    BUN 7 08/18/2020 1627    CREATININE <0.5 (L) 08/18/2020 1627        Component Value Date/Time    CALCIUM 9.0 08/18/2020 1627    ALKPHOS 94 08/18/2020 1627    AST 27 08/18/2020 1627    ALT 27 08/18/2020 1627    BILITOT 1.0 08/18/2020 1627                Lab Results   Component Value Date    WBC 13.1 (H) 05/07/2019    HGB 14.7 05/07/2019    HCT 42.4 05/07/2019    MCV 89.2 05/07/2019     05/07/2019     Lab Results   Component Value Date    LABA1C 7.5 08/18/2020     Lab Results   Component Value Date    .4 08/17/2017     Lab Results   Component Value Date    LABA1C 7.5 08/18/2020     No components found for: CHLPL  Lab Results   Component Value Date    TRIG 385 (H) 02/06/2020    TRIG 256 (H) 05/06/2017    TRIG 221 (H) 05/13/2016     Lab Results   Component Value Date    HDL 35 (L) 02/06/2020    HDL 38 (L) 09/10/2018    HDL 36 (L) 05/06/2017     Lab Results   Component Value Date    LDLCALC see below 02/06/2020    LDLCALC 82 09/10/2018    LDLCALC 93 05/06/2017     Lab Results   Component Value Date    LABVLDL see below 02/06/2020    LABVLDL 42 09/10/2018    LABVLDL 51 05/06/2017       Old labs and records reviewed or requested  Discussed past lab and studies with patient      Diagnosis Orders   1. Uncontrolled type 2 diabetes mellitus with hyperglycemia (HCC)  POCT glycosylated hemoglobin (Hb A1C)   2. History of colon polyps     3. Hyperlipidemia, unspecified hyperlipidemia type  Lipid Panel   4. Hypothyroidism due to acquired atrophy of thyroid     5. Other proteinuria     6. Gastroesophageal reflux disease without esophagitis     7. Need for shingles vaccine  Zoster [facility])   8. Need for influenza vaccination  INFLUENZA, QUADV, 3 YRS AND OLDER, IM PF, PREFILL SYR OR SDV, 0.5ML (AFLURIA QUADV, PF)       A1c 8.8. Uncontrolled diabetes. Start Jardiance if cost effective. Discussed diet exercise given information on free nutritionist at [place]. History of benign polyps. Follow-up routine colonoscopy. Hyperlipidemia lipid panel. Hypothyroidism recent thyroid functions acceptable. GE reflux. Continue PPI. Discussed weight loss. General health maintenance. Shingles and influenza vaccine given. Discussed mammogram and Pap smear. No follow-ups on file. Diagnosis and treatment discussed.   Possible side effects of medication reviewed  Patients questions answered  Follow up understood  Pt aware if they are not contacted about any test results , this does not mean they are normal.  They should call

## 2021-03-10 RX ORDER — LIRAGLUTIDE 6 MG/ML
INJECTION SUBCUTANEOUS
Qty: 2 PEN | Refills: 2 | Status: SHIPPED | OUTPATIENT
Start: 2021-03-10 | End: 2021-03-14 | Stop reason: SDUPTHER

## 2021-03-12 ENCOUNTER — TELEPHONE (OUTPATIENT)
Dept: FAMILY MEDICINE CLINIC | Age: 61
End: 2021-03-12

## 2021-03-14 RX ORDER — LIRAGLUTIDE 6 MG/ML
INJECTION SUBCUTANEOUS
Qty: 7 PEN | Refills: 0 | Status: SHIPPED | OUTPATIENT
Start: 2021-03-14 | End: 2021-06-21 | Stop reason: SDUPTHER

## 2021-04-20 DIAGNOSIS — I10 ESSENTIAL HYPERTENSION: ICD-10-CM

## 2021-04-20 RX ORDER — OLMESARTAN MEDOXOMIL 20 MG/1
TABLET ORAL
Qty: 90 TABLET | Refills: 0 | Status: SHIPPED | OUTPATIENT
Start: 2021-04-20 | End: 2021-07-19

## 2021-04-20 RX ORDER — GLIPIZIDE 10 MG/1
TABLET ORAL
Qty: 360 TABLET | Refills: 0 | Status: SHIPPED | OUTPATIENT
Start: 2021-04-20 | End: 2021-07-19

## 2021-04-20 NOTE — TELEPHONE ENCOUNTER
2021      RE: Bartolo Dickson  [address]  [facility] [address]             Date: 2021    PATIENT:  Bartolo Dickson  :          2003  PATEL:          2021    Dear Carol Ann Ricks:    I had the pleasure of seeing your patient, Bartolo Dickson, for a follow-up visit in the [facility] on 2021 at the [facility]. Please see below for my assessment and plan of care.      As you may recall, Bartolo is a 17 year old boy with class 1 obesity complicated by mild dyslipidemia and NAFLD.  Bartolo was last seen in this clinic 3 mos ago by me and once since then by our RD.  Bartolo was accompanied to today's appointment by mother.    Intercurrent History:  Weight is down 14 lbs over past 3 mos.  Mom has been giving him a tea made of 4 different herbs.  She heard that these can help with liver disease.  She does not remember the names.   Bartolo is also using the [employer] machine 20 min  2-3 times per week  No soda and no juice.  Now eating fewer tortilla.  Earl ENCISO  of a liposarcoma of stomach last month so mom is very concerned about Harjinder's liver health.    Mom is also worried about Bartolo's hair - she states it is getting very thin.  He does not have other sx of hypothyroidism nor a FH but we will check his TSH.      Current Medications:  No current outpatient medications on file.       Physical Exam:    Vitals:    B/P:   BP Readings from Last 1 Encounters:   21 132/83 (93 %, Z = 1.44 /  94 %, Z = 1.57)*     *BP percentiles are based on the 2017 AAP Clinical Practice Guideline for boys     BP:  Blood pressure reading is in the Stage 1 hypertension range (BP >= 130/80) based on the 2017 AAP Clinical Practice Guideline.  P:   Pulse Readings from Last 1 Encounters:   21 82       Weight:  Wt Readings from Last 4 Encounters:   21 86.6 kg (190  KYLEIGH  Past apt; 3/1  F/U: 6/1 "lb 14.7 oz) (95 %, Z= 1.64)*   12/17/20 87.4 kg (192 lb 9.6 oz) (96 %, Z= 1.70)*   10/22/20 92.6 kg (204 lb 2.3 oz) (98 %, Z= 1.99)*   01/23/20 88.6 kg (195 lb 5.2 oz) (98 %, Z= 2.00)*     * Growth percentiles are based on CDC (Boys, 2-20 Years) data.     Height:    Ht Readings from Last 4 Encounters:   01/21/21 1.715 m (5' 7.52\") (34 %, Z= -0.42)*   12/17/20 1.721 m (5' 7.76\") (38 %, Z= -0.32)*   10/22/20 1.717 m (5' 7.6\") (37 %, Z= -0.33)*   01/23/20 1.708 m (5' 7.25\") (43 %, Z= -0.17)*     * Growth percentiles are based on CDC (Boys, 2-20 Years) data.       Body Mass Index:  Body mass index is 29.44 kg/m .  Body Mass Index Percentile:  97 %ile (Z= 1.86) based on CDC (Boys, 2-20 Years) BMI-for-age based on BMI available as of 1/21/2021.    Labs:    Results for ORAL SANZ (MRN [id number]) as of 1/31/2021 17:31   Ref. Range 10/22/2020 15:09 1/21/2021 16:16   ALT Latest Ref Range: 0 - 50 U/L 203 (H) 79 (H)   AST Latest Ref Range: 0 - 35 U/L 65 (H) 26   Hemoglobin A1C Latest Ref Range: 0 - 5.6 % 5.2    TSH Latest Ref Range: 0.40 - 4.00 mU/L  0.81   Vitamin D Deficiency screening Latest Ref Range: 20 - 75 ug/L 14 (L)    Glucose Latest Ref Range: 70 - 99 mg/dL 85          Assessment:      Oral is a 17 year old male with class 1 obesity complicated by mild dyslipidemia and NAFLD.  Over the past 3 mos his weight is down 14 lbs.  He has modified his diet and activity patterns very nicely.  This is also reflected in a substantial decrease in his liver enzymes.  It is critical that he continues in order to sustain improved liver health.  As an aside, his TSH is normal - if hair thinning persists he should see his PCP.    40 min spent on the date of the encounter in chart review, patient visit, review of tests, documentation and/or discussion with other providers about the issues documented above.     Oral s current problem list reviewed today includes:    Encounter Diagnoses   Name Primary?     Class 1 " obesity      Vitamin D deficiency      Acanthosis nigricans      High blood triglycerides      Low HDL (under 40)      NAFLD (nonalcoholic fatty liver disease)         Care Plan:  Increase movement to daily.      We are looking forward to seeing Bartolo for a follow-up visit in 12 weeks.    Thank you for including me in the care of your patient.  Please do not hesitate to call with questions or concerns.    Sincerely,    Mary Romero MD MPH  Diplomate, American Board of Obesity Medicine    Director, Pediatric Weight Management Clinic  Department of Pediatrics  [facility] [phone]  [facility] [phone]  [facility] [phone]  [facility] [place] [phone]    Copy to patient    Parent(s) of Bartolo Alex Dickson  [address]   [place] [place] [address]

## 2021-06-01 ENCOUNTER — OFFICE VISIT (OUTPATIENT)
Dept: FAMILY MEDICINE CLINIC | Age: 61
End: 2021-06-01
Payer: OTHER GOVERNMENT

## 2021-06-01 VITALS
SYSTOLIC BLOOD PRESSURE: 119 MMHG | WEIGHT: 214 LBS | OXYGEN SATURATION: 97 % | DIASTOLIC BLOOD PRESSURE: 76 MMHG | BODY MASS INDEX: 37.91 KG/M2 | HEART RATE: 84 BPM

## 2021-06-01 DIAGNOSIS — E11.65 UNCONTROLLED TYPE 2 DIABETES MELLITUS WITH HYPERGLYCEMIA (HCC): Primary | ICD-10-CM

## 2021-06-01 DIAGNOSIS — K21.9 GASTROESOPHAGEAL REFLUX DISEASE WITHOUT ESOPHAGITIS: ICD-10-CM

## 2021-06-01 DIAGNOSIS — E03.4 HYPOTHYROIDISM DUE TO ACQUIRED ATROPHY OF THYROID: ICD-10-CM

## 2021-06-01 DIAGNOSIS — Z12.31 ENCOUNTER FOR SCREENING MAMMOGRAM FOR MALIGNANT NEOPLASM OF BREAST: ICD-10-CM

## 2021-06-01 DIAGNOSIS — E78.5 HYPERLIPIDEMIA, UNSPECIFIED HYPERLIPIDEMIA TYPE: ICD-10-CM

## 2021-06-01 DIAGNOSIS — Z86.010 HISTORY OF COLON POLYPS: ICD-10-CM

## 2021-06-01 DIAGNOSIS — Z87.891 HISTORY OF TOBACCO ABUSE: ICD-10-CM

## 2021-06-01 LAB — HBA1C MFR BLD: 8 %

## 2021-06-01 PROCEDURE — 3052F HG A1C>EQUAL 8.0%<EQUAL 9.0%: CPT | Performed by: INTERNAL MEDICINE

## 2021-06-01 PROCEDURE — 99213 OFFICE O/P EST LOW 20 MIN: CPT | Performed by: INTERNAL MEDICINE

## 2021-06-01 PROCEDURE — 83036 HEMOGLOBIN GLYCOSYLATED A1C: CPT | Performed by: INTERNAL MEDICINE

## 2021-06-01 RX ORDER — EMPAGLIFLOZIN 10 MG/1
10 TABLET, FILM COATED ORAL DAILY
Qty: 30 TABLET | Refills: 0 | Status: SHIPPED | OUTPATIENT
Start: 2021-06-01 | End: 2021-09-21

## 2021-06-01 SDOH — ECONOMIC STABILITY: FOOD INSECURITY: WITHIN THE PAST 12 MONTHS, YOU WORRIED THAT YOUR FOOD WOULD RUN OUT BEFORE YOU GOT MONEY TO BUY MORE.: NEVER TRUE

## 2021-06-01 SDOH — ECONOMIC STABILITY: FOOD INSECURITY: WITHIN THE PAST 12 MONTHS, THE FOOD YOU BOUGHT JUST DIDN'T LAST AND YOU DIDN'T HAVE MONEY TO GET MORE.: NEVER TRUE

## 2021-06-01 ASSESSMENT — SOCIAL DETERMINANTS OF HEALTH (SDOH): HOW HARD IS IT FOR YOU TO PAY FOR THE VERY BASICS LIKE FOOD, HOUSING, MEDICAL CARE, AND HEATING?: NOT HARD AT ALL

## 2021-06-01 NOTE — PATIENT INSTRUCTIONS
Schedule mammogram and ct screen at [facility]   Continue exercise  10 pound weight loss next visit. Cut back calories  Abbot nutrition   code 154  a1c is 8  I have reordered jardiance 10 mg.   Let me know sugars on this and can increase to 25 if not at goal.

## 2021-06-01 NOTE — PROGRESS NOTES
HPI: Mckenna Camargo presents for diabetes    Chronic health issues are Ménière's, uncontrolled diabetes, BMI 38, colonic polyp, splenic aneurysm, hypothyroidism, hyperlipidemia, BPPV, umbilical hernia.      DM2.  A1c 8.6 May 2019.  No retinopathy. + proteinuria.  Better compliance currently.  Glipizide increased to 40 mg a  During weekdays. No lows. Nocturia.  Taking Victoza 1.8 daily.  Metformin thousand twice daily.  Myalgias improved with switch to simvastatin from atorvastatin. No low sugars. Denies any peripheral neuropathy.   ARB. a1c 8.8 3..walking nightly mother. .      Hypertension.  Echocardiogram  with ejection fraction 60%.    Active.  Currently back on her Benicar 20 .  No chest pain palpitations or new exercise intolerance. No edema      Ménière's with hearing loss and occasional vertigo not an issue today        no abnormal pap. No STD concerns. No fh ovarian or breast cancer in family, pap 2017 [facility].  .  Tubal pregnancy.  Due mammogram May 2021     CT of the abdomen with  able splenic aneurysm. [facility] follow-up for splenic aneurysm.  Abdominal CT.     Colonoscopy due May 2024 last with adenomatous polyp and hemorrhoids     Hypothyroid. Neg nodules 2. good thyroid laboratories          PMH:    Cholecystectomy  Dolly Iha    Hx tubal pregnancy laparotomy      SH: . son bought their previous house and daughter living with him. [place] her new home.  . Gopal Boogie at home secondary to the pandemic.  Enjoys working at home.  Tobacco cessation 60 pack years discontinued 20 yr ago. Rare alcohol. Rare sex. Dyspareunia. No drugs.  No exercise     FH: 2 sisters 1 brother    + DM,MGF suicide, dementia in 66's     - colon, breast, ovarina cancer.     Review of systems: Remote concussion. Cataract. No chronic sinus symptoms. Denies any wheezing pneumonias or abnormal chest x-rays. + Vertigo.  Denies emphysema 60-pack-year tobacco..  No chest pain palpitations or syncope. No breast masses. Denies any GE reflux or bloody stools. No kidney stones or recurrent bladder infections. Rare knee pain. Tinea of the toes. Dyspareunia     Constitutional, ent, CV, respiratory, GI, , joint, skin, allergic and psychiatric ROS reviewed and negative except for above    Allergies   Allergen Reactions    Lisinopril      Cough/scratchy throat       Outpatient Medications Marked as Taking for the 21 encounter (Office Visit) with Kaur Shah MD   Medication Sig Dispense Refill    empagliflozin (JARDIANCE) 10 MG tablet Take 1 tablet by mouth daily 30 tablet 0    metFORMIN (GLUCOPHAGE) 1000 MG tablet TAKE ONE TABLET BY MOUTH TWICE A DAY WITH MEALS 180 tablet 0    olmesartan (BENICAR) 20 MG tablet TAKE ONE TABLET BY MOUTH DAILY 90 tablet 0    glipiZIDE (GLUCOTROL) 10 MG tablet TAKE ONE TO TWO TABLETS BY MOUTH BEFORE BREAKFAST AND TAKE 2 TABLETS BY MOUTH BEFORE SUPPER 360 tablet 0    Liraglutide (VICTOZA) 18 MG/3ML SOPN SC injection DIAL AND INJECT UNDER THE SKIN 1.8 MG DAILY 7 pen 0    levothyroxine (SYNTHROID) 112 MCG tablet TAKE ONE TABLET BY MOUTH DAILY 90 tablet 3    simvastatin (ZOCOR) 20 MG tablet TAKE ONE TABLET BY MOUTH EVERY NIGHT AT BEDTIME 90 tablet 1    blood glucose monitor kit and supplies Test bid & as needed for symptoms of irregular blood glucose. 1 kit 0    blood glucose monitor strips Test bid & as needed for symptoms of irregular blood glucose.  100 strip 0    vitamin D (CHOLECALCIFEROL) 1000 UNIT TABS tablet Take 1,000 Units by mouth daily               Past Medical History:   Diagnosis Date    GERD (gastroesophageal reflux disease)     History of colon polyps 2019    HYPERPLASTIC POLYP     Hyperlipidemia     Hypothyroidism     Type II or unspecified type diabetes mellitus without mention of complication, not stated as uncontrolled        Past Surgical History:   Procedure Laterality Date     SECTION      x 4    03/01/2021    HDL 35 (L) 02/06/2020    HDL 38 (L) 09/10/2018     Lab Results   Component Value Date    LDLCALC see below 03/01/2021    [address] see below 02/06/2020    [address] 82 09/10/2018     Lab Results   Component Value Date    LABVLDL see below 03/01/2021    LABVLDL see below 02/06/2020    LABVLDL 42 09/10/2018       Old labs and records reviewed or requested  Discussed past lab and studies with patient      Diagnosis Orders   1. Uncontrolled type 2 diabetes mellitus with hyperglycemia (HCC)  POCT glycosylated hemoglobin (Hb A1C)   2. Hypothyroidism due to acquired atrophy of thyroid     3. Hyperlipidemia, unspecified hyperlipidemia type     4. History of colon polyps     5. Gastroesophageal reflux disease without esophagitis     6. Encounter for screening mammogram for malignant neoplasm of breast  [facility] DIGITAL SCREEN W OR WO CAD BILATERAL   7. History of tobacco abuse  CT Lung Screen (Initial or Annual)     A1c is 8. Improvement however not at goal.  Will give Jardiance. Was given information on dietitian. Requested 10 pound weight loss prior to next visit    Hypothyroidism doing well on current dose. Continue. Hyperlipidemia tolerating Zocor. Hypertriglyceridemia. History of colonic polyps. Recheck in 24 due. Routine health maintenance mammogram orders given. We will also do CT screening for lung cancer. Return in about 3 months (around 9/1/2021). Diagnosis and treatment discussed.   Possible side effects of medication reviewed  Patients questions answered  Follow up understood  Pt aware if they are not contacted about any test results , this does not mean they are normal.  They should call

## 2021-06-07 ENCOUNTER — TELEPHONE (OUTPATIENT)
Dept: FAMILY MEDICINE CLINIC | Age: 61
End: 2021-06-07

## 2021-06-07 NOTE — TELEPHONE ENCOUNTER
If unable to find a coupon can see if insurance will cover higher dose victoza. Max dose 3 mg although this dose is usually used for weight loss. Actos is another possibility. Was she able to find a coupon on line?

## 2021-06-07 NOTE — TELEPHONE ENCOUNTER
Med was call in for Jardiance. States will cost over $200 a month. Unable to afford. Going to see if can find coupons- I suggested to try \"Good Rx\". Wanting to know if a cheaper alternative med? please advise.  Please call Mallory Brunson back at [phone]

## 2021-06-21 ENCOUNTER — TELEPHONE (OUTPATIENT)
Dept: FAMILY MEDICINE CLINIC | Age: 61
End: 2021-06-21

## 2021-06-21 RX ORDER — LIRAGLUTIDE 6 MG/ML
INJECTION SUBCUTANEOUS
Qty: 28 PEN | Refills: 5 | Status: SHIPPED | OUTPATIENT
Start: 2021-06-21 | End: 2022-03-31

## 2021-06-21 NOTE — TELEPHONE ENCOUNTER
PT called in stating that her sugar levels have been running lower than normal. She says that they've been between . PT adv that she has stopped drinking pop all together and thinks this could be the reason for the drop in her numbers but would like to know if Dr. Roscoe Hutton could adjust her medications or what should be done.      Please call back: [phone]

## 2021-06-21 NOTE — TELEPHONE ENCOUNTER
Fax received    [facility]    No f/u     Last seen 6/1/21
DISPLAY PLAN FREE TEXT

## 2021-07-19 DIAGNOSIS — I10 ESSENTIAL HYPERTENSION: ICD-10-CM

## 2021-07-19 DIAGNOSIS — E78.00 PURE HYPERCHOLESTEROLEMIA: ICD-10-CM

## 2021-07-19 RX ORDER — GLIPIZIDE 10 MG/1
TABLET ORAL
Qty: 360 TABLET | Refills: 0 | Status: SHIPPED | OUTPATIENT
Start: 2021-07-19 | End: 2021-10-27

## 2021-07-19 RX ORDER — SIMVASTATIN 20 MG
TABLET ORAL
Qty: 90 TABLET | Refills: 0 | Status: SHIPPED | OUTPATIENT
Start: 2021-07-19 | End: 2021-10-27

## 2021-07-19 RX ORDER — OLMESARTAN MEDOXOMIL 20 MG/1
TABLET ORAL
Qty: 90 TABLET | Refills: 0 | Status: SHIPPED | OUTPATIENT
Start: 2021-07-19 | End: 2021-10-27

## 2021-09-01 ENCOUNTER — TELEPHONE (OUTPATIENT)
Dept: FAMILY MEDICINE CLINIC | Age: 61
End: 2021-09-01

## 2021-09-01 DIAGNOSIS — S93.402A SPRAIN OF LEFT ANKLE, UNSPECIFIED LIGAMENT, INITIAL ENCOUNTER: Primary | ICD-10-CM

## 2021-09-01 NOTE — TELEPHONE ENCOUNTER
Twisted ankle about 3 weeks ago. Still swollen and painful. Wanting to know if needs OV, x-ray? Please advise.  Please call Blanca back at [phone]

## 2021-09-01 NOTE — TELEPHONE ENCOUNTER
Spoke with patient. Left ankle. She states when leaving home her she turned sideways and her ankle buckled.

## 2021-09-21 ENCOUNTER — OFFICE VISIT (OUTPATIENT)
Dept: FAMILY MEDICINE CLINIC | Age: 61
End: 2021-09-21
Payer: OTHER GOVERNMENT

## 2021-09-21 VITALS
OXYGEN SATURATION: 98 % | WEIGHT: 213 LBS | RESPIRATION RATE: 16 BRPM | SYSTOLIC BLOOD PRESSURE: 130 MMHG | DIASTOLIC BLOOD PRESSURE: 82 MMHG | BODY MASS INDEX: 37.74 KG/M2 | HEIGHT: 63 IN | HEART RATE: 69 BPM

## 2021-09-21 DIAGNOSIS — H81.11 BENIGN PAROXYSMAL POSITIONAL VERTIGO OF RIGHT EAR: ICD-10-CM

## 2021-09-21 DIAGNOSIS — R80.8 OTHER PROTEINURIA: ICD-10-CM

## 2021-09-21 DIAGNOSIS — E03.4 HYPOTHYROIDISM DUE TO ACQUIRED ATROPHY OF THYROID: ICD-10-CM

## 2021-09-21 DIAGNOSIS — Z86.010 HISTORY OF COLON POLYPS: ICD-10-CM

## 2021-09-21 DIAGNOSIS — K21.9 GASTROESOPHAGEAL REFLUX DISEASE WITHOUT ESOPHAGITIS: ICD-10-CM

## 2021-09-21 DIAGNOSIS — E78.5 HYPERLIPIDEMIA, UNSPECIFIED HYPERLIPIDEMIA TYPE: ICD-10-CM

## 2021-09-21 DIAGNOSIS — Z12.31 ENCOUNTER FOR SCREENING MAMMOGRAM FOR MALIGNANT NEOPLASM OF BREAST: ICD-10-CM

## 2021-09-21 DIAGNOSIS — I10 ESSENTIAL HYPERTENSION: ICD-10-CM

## 2021-09-21 DIAGNOSIS — Z23 NEED FOR INFLUENZA VACCINATION: ICD-10-CM

## 2021-09-21 DIAGNOSIS — E11.65 UNCONTROLLED TYPE 2 DIABETES MELLITUS WITH HYPERGLYCEMIA (HCC): Primary | ICD-10-CM

## 2021-09-21 LAB
ANION GAP SERPL CALCULATED.3IONS-SCNC: 18 MMOL/L (ref 3–16)
BUN BLDV-MCNC: 8 MG/DL (ref 7–20)
CALCIUM SERPL-MCNC: 8.8 MG/DL (ref 8.3–10.6)
CHLORIDE BLD-SCNC: 103 MMOL/L (ref 99–110)
CO2: 24 MMOL/L (ref 21–32)
CREAT SERPL-MCNC: <0.5 MG/DL (ref 0.6–1.1)
GFR AFRICAN AMERICAN: >60
GFR NON-AFRICAN AMERICAN: >60
GLUCOSE BLD-MCNC: 170 MG/DL (ref 70–99)
POTASSIUM SERPL-SCNC: 3.9 MMOL/L (ref 3.5–5.1)
SODIUM BLD-SCNC: 145 MMOL/L (ref 136–145)

## 2021-09-21 PROCEDURE — 99214 OFFICE O/P EST MOD 30 MIN: CPT | Performed by: INTERNAL MEDICINE

## 2021-09-21 PROCEDURE — 90471 IMMUNIZATION ADMIN: CPT | Performed by: INTERNAL MEDICINE

## 2021-09-21 PROCEDURE — 90674 CCIIV4 VAC NO PRSV 0.5 ML IM: CPT | Performed by: INTERNAL MEDICINE

## 2021-09-21 PROCEDURE — 36415 COLL VENOUS BLD VENIPUNCTURE: CPT | Performed by: INTERNAL MEDICINE

## 2021-09-21 PROCEDURE — 3052F HG A1C>EQUAL 8.0%<EQUAL 9.0%: CPT | Performed by: INTERNAL MEDICINE

## 2021-09-21 NOTE — PROGRESS NOTES
HPI: Hermelindo Juan presents for diabetes. Chronic health issues are Ménière's, uncontrolled diabetes, BMI 38, colonic polyp, splenic aneurysm, hypothyroidism, hyperlipidemia, BPPV, umbilical hernia.      DM2.  A1c 8..  No retinopathy. + proteinuria.  Jardiance cost prohibitive. Glipizide to twice daily. Metformin at thousand twice daily had some low sugars when she discontinued her sugary sodas and discontinued her Victoza. States that sugars have been good. No adverse effect to medication. BMI is 37. Positive aspirin, statin, ARB. Walking 30 minutes nightly with her mother. LDL 70 triglycerides 350 2021     Hypertension.  Echocardiogram  with ejection fraction 60%.    Active.  Currently back on her Benicar 20 .  No chest pain palpitations or new exercise intolerance. No edema      Ménière's with hearing loss and occasional vertigo not an issue today        no abnormal pap. No STD concerns. No fh ovarian or breast cancer in family, pap  [facility].  .  Tubal pregnancy.  Due mammogram May 2021 has not scheduled     CT of the abdomen with  able splenic aneurysm. Jayjay Villafuerte     Colonoscopy due May 2024 last with adenomatous polyp and hemorrhoids     Hypothyroid. Neg nodules 2. good thyroid laboratories        60-pack-year tobacco.  Denies any wheeze hemoptysis shortness of breath. No CT screening today aware it is available      PMH:    Cholecystectomy    Childbirth    Hx tubal pregnancy laparotomy      SH: . son bought their previous house and daughter living with him. [place] her new home.   70 hour week. . Is to retire at 65.  Working at home secondary to the pandemic.  Enjoys working at home.  Tobacco cessation 60 pack years discontinued 20 yr ago. Rare alcohol. Rare sex. Dyspareunia.  No drugs.  No exercise     FH: 2 sisters 1 brother    + DM,MGF suicide, dementia in 66's     - colon, breast, ovarina cancer.     Review of systems: Remote concussion. Cataract. No chronic sinus symptoms. Denies any wheezing pneumonias or abnormal chest x-rays. + Vertigo.  Denies emphysema 60-pack-year tobacco.. No chest pain palpitations or syncope. No breast masses. And acid for GE reflux no bloody stools. No kidney stones or recurrent bladder infections. Rare knee pain. Dyspareunia    Constitutional, ent, CV, respiratory, GI, , joint, skin, allergic and psychiatric ROS reviewed and negative except for above    Allergies   Allergen Reactions    Lisinopril      Cough/scratchy throat       Outpatient Medications Marked as Taking for the 21 encounter (Office Visit) with Kamla Galvez MD   Medication Sig Dispense Refill    glipiZIDE (GLUCOTROL) 10 MG tablet TAKE 1 TO 2 TABLETS BY MOUTH BEFORE BREAKFAST AND TAKE 2 TABLETS BEFORE SUPPER 360 tablet 0    olmesartan (BENICAR) 20 MG tablet TAKE 1 TABLET BY MOUTH DAILY 90 tablet 0    simvastatin (ZOCOR) 20 MG tablet TAKE ONE TABLET BY MOUTH EVERY NIGHT AT BEDTIME 90 tablet 0    metFORMIN (GLUCOPHAGE) 1000 MG tablet TAKE 1 TABLET BY MOUTH TWO TIMES A DAY WITH MEALS 180 tablet 0    levothyroxine (SYNTHROID) 112 MCG tablet TAKE ONE TABLET BY MOUTH DAILY 90 tablet 3    blood glucose monitor kit and supplies Test bid & as needed for symptoms of irregular blood glucose. 1 kit 0    blood glucose monitor strips Test bid & as needed for symptoms of irregular blood glucose.  100 strip 0    Biotin (BIOTIN 5000) 5 MG CAPS Take by mouth      vitamin D (CHOLECALCIFEROL) 1000 UNIT TABS tablet Take 1,000 Units by mouth daily               Past Medical History:   Diagnosis Date    GERD (gastroesophageal reflux disease)     History of colon polyps 2019    HYPERPLASTIC POLYP     Hyperlipidemia     Hypothyroidism     Type II or unspecified type diabetes mellitus without mention of complication, not stated as uncontrolled        Past Surgical History:   Procedure Laterality Date     SECTION      x 4    (L) 02/06/2020    HDL 38 (L) 09/10/2018     Lab Results   Component Value Date    LDLCALC see below 03/01/2021    [address] see below 02/06/2020    [address] 82 09/10/2018     Lab Results   Component Value Date    LABVLDL see below 03/01/2021    LABVLDL see below 02/06/2020    LABVLDL 42 09/10/2018       Old labs and records reviewed or requested  Discussed past lab and studies with patient     A1c of 8.3   Diagnosis Orders   1. Uncontrolled type 2 diabetes mellitus with hyperglycemia (HCC)  Basic Metabolic Panel   2. Need for influenza vaccination  INFLUENZA, MDCK QUADV, 2 YRS AND OLDER, IM, PF, PREFILL SYR OR SDV, 0.5ML (FLUCELVAX QUADV, PF)   3. Benign paroxysmal positional vertigo of right ear     4. Gastroesophageal reflux disease without esophagitis     5. History of colon polyps     6. Hyperlipidemia, unspecified hyperlipidemia type     7. Hypothyroidism due to acquired atrophy of thyroid     8. Other proteinuria     9. Encounter for screening mammogram for malignant neoplasm of breast  [facility] DIGITAL SCREEN W OR WO CAD BILATERAL   10. Essential hypertension  Basic Metabolic Panel     Trial Trulicity. If low sugars will cut back on her glipizide to 1 before breakfast.  Continue weight loss low-carb diet. She will let me know if Trulicity is too expensive. Flu vaccine given. Referral for mammogram.    History of colonic polyps follow-up in 24. Hypertriglyceridemia tolerating statin. Recheck again in March. Hypertension good control continue current medications. No follow-ups on file. Diagnosis and treatment discussed.   Possible side effects of medication reviewed  Patients questions answered  Follow up understood  Pt aware if they are not contacted about any test results , this does not mean they are normal.  They should call

## 2021-09-21 NOTE — PATIENT INSTRUCTIONS
Let me know if trulicity is not affordable and can use victoza. Continue nice exercise, weight loss,  Don't work too hard  Follow up 3 months for repeat a1c  Consider CT screening for lung cancer    Patient Education        Learning About Lung Cancer Screening  What is screening for lung cancer? Lung cancer screening is a way to find some lung cancers early, before a person has any symptoms of the cancer. Lung cancer screening may help those who have the highest risk for lung cancerpeople 50 to 55 and older who are or were heavy smokers. For most people, who aren't at increased risk, screening for lung cancer probably isn't helpful. Screening won't prevent cancer. And it may not find all lung cancers. Lung cancer screening may lower the risk of dying from lung cancer in a small number of people. How is it done? Lung cancer screening is done with a low-dose CT (computed tomography) scan. A CT scan uses X-rays, or radiation, to make detailed pictures of your body. Experts recommend that screening be done in medical centers that focus on finding and treating lung cancer. Who is screening recommended for? Lung cancer screening is recommended for people who are or were heavy smokers. That means people with a smoking history of at least 20 or 30 pack years. A pack year is a way to measure how heavy a smoker you are or were. To figure out your pack years, multiply how many packs a day on average (assuming 20 cigarettes per pack) you have smoked by how many years you have smoked. For example:  · If you smoked 1 pack a day for 15 years, that's 1 times 15. So you have a smoking history of 15 pack years. · If you smoked 1 pack a day for 20 years, that's 1 times 20. So you have a smoking history of 20 pack years. · If you smoked 2 packs a day for 15 years, that's 2 times 15. So you have a smoking history of 30 pack years. Experts agree that screening is for people who have a high risk of lung cancer.  But experts don't agree on what high risk means. Some say people age 48 or older with at least a 20-pack-year smoking history are high risk. Others say it's people age 54 or older with a 30-pack-year history. Experts may also have other rules about who should be screened. For example, here is the guideline from the U.S. Preventive Services Task Force (USPSTF), which recommends lung cancer screening if:  · You are 54to [de-identified]years old. · And you have a smoking history of at least 30 pack years. · And you still smoke, or you quit within the last 15 years. · And you are not in poor health overall. (Having a serious health problem might mean that you couldn't have treatment for lung cancer. The treatment could be too high-risk, and it might not help you live longer.)  The USPSTF also recommends that you no longer need lung cancer screening if you have not smoked for 15 years or if you have a serious health problem. What are the risks of screening? CT screening for lung cancer isn't perfect. It can show an abnormal result when it turns out there wasn't any cancer. This is called a false-positive result. This means you may need more tests to make sure you don't have cancer. These tests can be harmful and cause a lot of worry. These tests may include more CT scans and invasive testing like a lung biopsy. In a biopsy, the doctor takes a sample of tissue from inside your lung so it can be looked at under a microscope. A biopsy is the only way to tell if you have lung cancer. If the biopsy finds cancer, you and your doctor will have to decide how or whether to treat it. Some lung cancers found on CT scans are harmless and would not have caused a problem if they had not been found through screening. But because doctors can't tell which ones will turn out to be harmless, most will be treated. This means that you may get treatmentincluding surgery, radiation, or chemotherapythat you don't need.   There is a risk of damage to cells or tissue from being exposed to radiation, including the small amounts used in CTs, X-rays, and other medical tests. Over time, exposure to radiation may cause cancer and other health problems. But in most cases, the risk of getting cancer from being exposed to small amounts of radiation is low. It's not a reason to avoid these tests for most people. What are the benefits of screening? Your scan may be normal (negative). For some people who are at higher risk, screening lowers the chance of dying of lung cancer. How much and how long you smoked helps to determine your risk level. Screening can find some cancers early, when treatment may be more likely to work. What happens after screening? The results of your CT scan will be sent to your doctor. Someone from your care team will explain the results of your scan and answer any questions you may have. If you need any follow-up, he or she will help you understand what to do next. After a lung cancer screening, you can go back to your usual activities right away. A lung cancer screening test can't tell if you have lung cancer. If your results are positive, your doctor can't tell whether an abnormal finding is a harmless nodule, cancer, or something else without doing more tests. What can you do to help prevent lung cancer? Some lung cancers can't be prevented. But if you smoke, quitting smoking is the best step you can take to prevent lung cancer. If you want to quit, your doctor can recommend medicines or other ways to help. Follow-up care is a key part of your treatment and safety. Be sure to make and go to all appointments, and call your doctor if you are having problems. It's also a good idea to know your test results and keep a list of the medicines you take. Where can you learn more? Go to [email or web address] and sign in to your [employer] account.  Enter Y697 in the BMdr box to learn more about \"Learning About Lung Cancer Screening. \"     If you do not have an account, please click on the \"Sign Up Now\" link. Current as of: December 17, 2020               Content Version: 12.9  © 6740-9047 [employer]wise, Incorporated. Care instructions adapted under license by [employer] If you have questions about a medical condition or this instruction, always ask your healthcare professional. [address] any warranty or liability for your use of this information.

## 2021-09-21 NOTE — PROGRESS NOTES
Vaccine Information Sheet, \"Influenza - Inactivated\"  given to Audrey Mccormack, or parent/legal guardian of  Audrey Mccormack and verbalized understanding. Patient responses:    Have you received any other vaccine within the last 14 days? No  Do you currently have an active infectious or acute respiratory illness or fever? No  Have you ever had a reaction to a flu vaccine? No  Do you have any current illness? No  Have you ever had Guillian [place] Syndrome? No  Do you have a serious allergy to any of the follow: Neomycin, Polymyxin, Thimerosal, eggs or egg products? No      Flu vaccine given per order. Please see immunization tab. Risks and benefits explained. Current VIS given.       Immunizations Administered     Name Date Dose Route    Influenza, MDCK Quadv, IM, PF (Flucelvax 2 yrs and older) 9/21/2021 0.5 mL Intramuscular    Site: Deltoid- Left    Lot: [id number]    NDC: 89496-006-54

## 2021-10-26 DIAGNOSIS — E78.00 PURE HYPERCHOLESTEROLEMIA: ICD-10-CM

## 2021-10-26 DIAGNOSIS — I10 ESSENTIAL HYPERTENSION: ICD-10-CM

## 2021-10-27 RX ORDER — GLIPIZIDE 10 MG/1
TABLET ORAL
Qty: 360 TABLET | Refills: 0 | Status: SHIPPED | OUTPATIENT
Start: 2021-10-27 | End: 2022-02-14

## 2021-10-27 RX ORDER — SIMVASTATIN 20 MG
TABLET ORAL
Qty: 90 TABLET | Refills: 0 | Status: SHIPPED | OUTPATIENT
Start: 2021-10-27 | End: 2022-02-14

## 2021-10-27 RX ORDER — OLMESARTAN MEDOXOMIL 20 MG/1
TABLET ORAL
Qty: 90 TABLET | Refills: 0 | Status: SHIPPED | OUTPATIENT
Start: 2021-10-27 | End: 2022-02-14

## 2021-10-27 NOTE — TELEPHONE ENCOUNTER
Spoke with patient, she is taking 2 Glipizide in the Am and 2 nightly. Metformin she is taking 1 in the Am and 1 nightly. Sugars in the afternoon run 130-160's. Night sugars are 200's.

## 2022-02-13 DIAGNOSIS — E03.4 HYPOTHYROIDISM DUE TO ACQUIRED ATROPHY OF THYROID: ICD-10-CM

## 2022-02-13 DIAGNOSIS — I10 ESSENTIAL HYPERTENSION: ICD-10-CM

## 2022-02-13 DIAGNOSIS — E78.00 PURE HYPERCHOLESTEROLEMIA: ICD-10-CM

## 2022-02-14 RX ORDER — SIMVASTATIN 20 MG
TABLET ORAL
Qty: 90 TABLET | Refills: 0 | Status: SHIPPED | OUTPATIENT
Start: 2022-02-14 | End: 2022-05-18

## 2022-02-14 RX ORDER — OLMESARTAN MEDOXOMIL 20 MG/1
TABLET ORAL
Qty: 90 TABLET | Refills: 0 | Status: SHIPPED | OUTPATIENT
Start: 2022-02-14 | End: 2022-05-18

## 2022-02-14 RX ORDER — GLIPIZIDE 10 MG/1
TABLET ORAL
Qty: 360 TABLET | Refills: 0 | Status: SHIPPED | OUTPATIENT
Start: 2022-02-14 | End: 2022-05-18

## 2022-02-14 RX ORDER — LEVOTHYROXINE SODIUM 112 UG/1
TABLET ORAL
Qty: 90 TABLET | Refills: 3 | Status: SHIPPED | OUTPATIENT
Start: 2022-02-14

## 2022-03-30 NOTE — PROGRESS NOTES
HPI: Timoteo Tovar presents for follow-up diabetes      Chronic health issues are Ménière's, uncontrolled diabetes, BMI 38, colonic polyp, splenic aneurysm, hypothyroidism, hyperlipidemia, BPPV, umbilical hernia. UTI      DM2.  A1c 8..  No retinopathy. + proteinuria. victoza x 3 month . Jardiance cost prohibitive. Glipizide to twice daily. Metformin 100 bid missing p.m. dose of medication frequently. Victoza daily. BMI is 37. Not using baby aspirin. Positive statin, ARB.       Hypertension.  Echocardiogram  with ejection fraction 60%.    Active.  Currently back on her Benicar 20 .  No chest pain palpitations or new exercise intolerance.  No edema      Ménière's with hearing loss and occasional vertigo not an issue today        no abnormal pap. No STD concerns. No fh ovarian or breast cancer in family, pap  [facility].  .  Tubal pregnancy.  Due mammogram May 2021 has not scheduled     CT of the abdomen with  able splenic aneurysm. [facility] Pretty     Colonoscopy due May 2024 last with adenomatous polyp and hemorrhoids     Hypothyroid. Neg nodules 2. good thyroid laboratories         60-pack-year tobacco.  Denies any wheeze hemoptysis shortness of breath. No CT screening today aware it is available    Specialist  GYN,  EYE  Dentiist  GI       PMH:    Cholecystectomy    Childbirth    Hx tubal pregnancy laparotomy      SH: . son bought their previous house and daughter living with him. [place] her new home.   70 hour week. . Is to retire at 65.  Working at home secondary to the pandemic.  Enjoys working at home.  Tobacco cessation 60 pack years discontinued 20 yr ago. Rare alcohol. Rare sex. Dyspareunia. No drugs.  No exercise     FH: 2 sisters 1 brother    + DM,MGF suicide, dementia in 66's     - colon, breast, ovarina cancer.     Review of systems: Remote concussion. Cataract. No chronic sinus symptoms.  Denies any wheezing pneumonias or abnormal chest x-rays. + Vertigo.  Denies emphysema 60-pack-year tobacco.. No chest pain palpitations or syncope. No breast masses. And acid for GE reflux no bloody stools. No kidney stones or recurrent bladder infections.  Rare knee pain. Dyspareunia    Constitutional, ent, CV, respiratory, GI, , joint, skin, allergic and psychiatric ROS reviewed and negative except for above    Allergies   Allergen Reactions    Lisinopril      Cough/scratchy throat       Outpatient Medications Marked as Taking for the 3/31/22 encounter (Office Visit) with Ana Matthew MD   Medication Sig Dispense Refill    meclizine (ANTIVERT) 12.5 MG tablet 1 po tid prn spinning 30 tablet 0    olmesartan (BENICAR) 20 MG tablet TAKE ONE TABLET BY MOUTH DAILY 90 tablet 0    levothyroxine (SYNTHROID) 112 MCG tablet TAKE ONE TABLET BY MOUTH DAILY 90 tablet 3    simvastatin (ZOCOR) 20 MG tablet TAKE ONE TABLET BY MOUTH EVERY NIGHT AT BEDTIME 90 tablet 0    metFORMIN (GLUCOPHAGE) 1000 MG tablet TAKE ONE TABLET BY MOUTH TWICE A DAY WITH MEALS 180 tablet 0    glipiZIDE (GLUCOTROL) 10 MG tablet TAKE 1 TO 2 TABLETS BY MOUTH BEFORE BREAKFAST AND 2 TABLETS BEFORE SUPPER 360 tablet 0    [DISCONTINUED] Liraglutide (VICTOZA) 18 MG/3ML SOPN SC injection DIAL AND INJECT UNDER THE SKIN 1.8 MG DAILY 28 pen 5    Biotin (BIOTIN 5000) 5 MG CAPS Take by mouth      vitamin D (CHOLECALCIFEROL) 1000 UNIT TABS tablet Take 1,000 Units by mouth daily               Past Medical History:   Diagnosis Date    GERD (gastroesophageal reflux disease)     History of colon polyps 2019    HYPERPLASTIC POLYP     Hyperlipidemia     Hypothyroidism     Type II or unspecified type diabetes mellitus without mention of complication, not stated as uncontrolled        Past Surgical History:   Procedure Laterality Date     SECTION      x 4    CHOLECYSTECTOMY  10/12/2011    Dr. Nola Richardson performed              Family History   Problem Relation Age of Onset    Diabetes 83 09/10/2018     Lab Results   Component Value Date    LABVLDL see below 03/01/2021    LABVLDL see below 02/06/2020    LABVLDL 42 09/10/2018       Old labs and records reviewed or requested  Discussed past lab and studies with patient      Diagnosis Orders   1. Uncontrolled type 2 diabetes mellitus with hyperglycemia (HCC)  POCT glycosylated hemoglobin (Hb A1C)    POCT Glucose    Comprehensive Metabolic Panel   2. Other proteinuria     3. Hypothyroidism due to acquired atrophy of thyroid  TSH with Reflex to FT4   4. Hyperlipidemia, unspecified hyperlipidemia type  Lipid Panel   5. History of colon polyps     6. Gastroesophageal reflux disease without esophagitis     7. Benign paroxysmal positional vertigo of right ear       Diabetes. Uncontrolled A1c is 9.5. Sugar 186. Discussed using long-acting Metformin declined states she will do better with taking her second dose of medicines discontinue her latte aware of low-carb diet and walking. Will follow back up with sugars in 1 month by telephone. History of UTIs making Jardiance for choice. Also cost prohibitive may need insulin. Hypothyroidism TSH free T4. Hyperlipidemia lipid profile liver function test.    BPPV    GE reflux see if we can switch from Prilosec to Pepcid    History of colonic polyps, GE reflux,  On this date I have spent 40 minutes reviewing previous notes, test results, and face to face with the patient discussing the diagnosis and plan          Return in about 1 month (around 4/30/2022) for video apt with sugars, weight . Diagnosis and treatment discussed.   Possible side effects of medication reviewed  Patients questions answered  Follow up understood  Pt aware if they are not contacted about any test results , this does not mean they are normal.  They should call

## 2022-03-31 ENCOUNTER — OFFICE VISIT (OUTPATIENT)
Dept: FAMILY MEDICINE CLINIC | Age: 62
End: 2022-03-31
Payer: OTHER GOVERNMENT

## 2022-03-31 VITALS
SYSTOLIC BLOOD PRESSURE: 130 MMHG | RESPIRATION RATE: 16 BRPM | HEART RATE: 82 BPM | OXYGEN SATURATION: 97 % | HEIGHT: 63 IN | DIASTOLIC BLOOD PRESSURE: 80 MMHG | BODY MASS INDEX: 38.09 KG/M2 | WEIGHT: 215 LBS

## 2022-03-31 DIAGNOSIS — H81.11 BENIGN PAROXYSMAL POSITIONAL VERTIGO OF RIGHT EAR: ICD-10-CM

## 2022-03-31 DIAGNOSIS — Z86.010 HISTORY OF COLON POLYPS: ICD-10-CM

## 2022-03-31 DIAGNOSIS — K21.9 GASTROESOPHAGEAL REFLUX DISEASE WITHOUT ESOPHAGITIS: ICD-10-CM

## 2022-03-31 DIAGNOSIS — R80.8 OTHER PROTEINURIA: ICD-10-CM

## 2022-03-31 DIAGNOSIS — E11.65 UNCONTROLLED TYPE 2 DIABETES MELLITUS WITH HYPERGLYCEMIA (HCC): Primary | ICD-10-CM

## 2022-03-31 DIAGNOSIS — E78.5 HYPERLIPIDEMIA, UNSPECIFIED HYPERLIPIDEMIA TYPE: ICD-10-CM

## 2022-03-31 DIAGNOSIS — E03.4 HYPOTHYROIDISM DUE TO ACQUIRED ATROPHY OF THYROID: ICD-10-CM

## 2022-03-31 LAB
A/G RATIO: 1.9 (ref 1.1–2.2)
ALBUMIN SERPL-MCNC: 4.6 G/DL (ref 3.4–5)
ALP BLD-CCNC: 113 U/L (ref 40–129)
ALT SERPL-CCNC: 36 U/L (ref 10–40)
ANION GAP SERPL CALCULATED.3IONS-SCNC: 19 MMOL/L (ref 3–16)
AST SERPL-CCNC: 31 U/L (ref 15–37)
BILIRUB SERPL-MCNC: 0.8 MG/DL (ref 0–1)
BUN BLDV-MCNC: 11 MG/DL (ref 7–20)
CALCIUM SERPL-MCNC: 10 MG/DL (ref 8.3–10.6)
CHLORIDE BLD-SCNC: 98 MMOL/L (ref 99–110)
CHOLESTEROL, TOTAL: 168 MG/DL (ref 0–199)
CHP ED QC CHECK: NORMAL
CO2: 23 MMOL/L (ref 21–32)
CREAT SERPL-MCNC: 0.5 MG/DL (ref 0.6–1.2)
GFR AFRICAN AMERICAN: >60
GFR NON-AFRICAN AMERICAN: >60
GLUCOSE BLD-MCNC: 183 MG/DL
GLUCOSE BLD-MCNC: 183 MG/DL (ref 70–99)
HBA1C MFR BLD: 9.5 %
HDLC SERPL-MCNC: 40 MG/DL (ref 40–60)
LDL CHOLESTEROL CALCULATED: 70 MG/DL
POTASSIUM SERPL-SCNC: 4 MMOL/L (ref 3.5–5.1)
SODIUM BLD-SCNC: 140 MMOL/L (ref 136–145)
TOTAL PROTEIN: 7 G/DL (ref 6.4–8.2)
TRIGL SERPL-MCNC: 291 MG/DL (ref 0–150)
TSH REFLEX FT4: 1.65 UIU/ML (ref 0.27–4.2)
VLDLC SERPL CALC-MCNC: 58 MG/DL

## 2022-03-31 PROCEDURE — 83036 HEMOGLOBIN GLYCOSYLATED A1C: CPT | Performed by: INTERNAL MEDICINE

## 2022-03-31 PROCEDURE — 3046F HEMOGLOBIN A1C LEVEL >9.0%: CPT | Performed by: INTERNAL MEDICINE

## 2022-03-31 PROCEDURE — 82962 GLUCOSE BLOOD TEST: CPT | Performed by: INTERNAL MEDICINE

## 2022-03-31 PROCEDURE — 36415 COLL VENOUS BLD VENIPUNCTURE: CPT | Performed by: INTERNAL MEDICINE

## 2022-03-31 PROCEDURE — 99214 OFFICE O/P EST MOD 30 MIN: CPT | Performed by: INTERNAL MEDICINE

## 2022-03-31 RX ORDER — MECLIZINE HCL 12.5 MG/1
TABLET ORAL
Qty: 30 TABLET | Refills: 0 | Status: SHIPPED | OUTPATIENT
Start: 2022-03-31

## 2022-03-31 ASSESSMENT — PATIENT HEALTH QUESTIONNAIRE - PHQ9
SUM OF ALL RESPONSES TO PHQ QUESTIONS 1-9: 0
2. FEELING DOWN, DEPRESSED OR HOPELESS: 0
SUM OF ALL RESPONSES TO PHQ QUESTIONS 1-9: 0
SUM OF ALL RESPONSES TO PHQ9 QUESTIONS 1 & 2: 0
SUM OF ALL RESPONSES TO PHQ QUESTIONS 1-9: 0
1. LITTLE INTEREST OR PLEASURE IN DOING THINGS: 0
SUM OF ALL RESPONSES TO PHQ QUESTIONS 1-9: 0

## 2022-03-31 NOTE — PATIENT INSTRUCTIONS
Try taking Pepcid ( famotidine) or zantac ( ranitidine) instead of omeprazole. Do your best with diet exercise and decreased portions and increase walking  Set up telephone apt in 1 month with sugar values before breakfast and supper  Goal is sugar less than 120 fasting  Consider changing to the extended release metformin that you can take all at once    Don't forget your pap smear  Patient Education        Learning About Low-Carbohydrate Foods  What foods are low in carbohydrate? The foods you eat contain nutrients, such as vitamins and minerals. Carbohydrate is a nutrient. Your body needs the right amount to stay healthy and work as it should. You can use the list below to help you make choicesabout which foods to eat. Some foods that are lower in carbohydrate include:  Dairy and dairy alternatives   Cheese   Cottage cheese   Cream cheese   Nut milk (unsweetened)   Soy milk (unsweetened)   Yogurt ([place], plain)  Fruits   Avocado   [employer] and other protein foods   Almonds   Beef   Chicken   Cod   Eggs   Halibut   Peanut butter and other nut butters   Pistachios   Pork   Pumpkin seeds   Tofu   Trout   [place]   [place] [facility]   Walnuts  Vegetables   Broccoli   Carrots   Cauliflower   Green beans   Mushrooms   Peppers   Salad greens   Spinach   Tomatoes  Work with your doctor to find out how much of this nutrient you need. Dependingon your health, you may need more or less of it in your diet. Where can you learn more? Go to [email or web address] org and sign in to your [employer] account. Enter [phone] in the [facility] box to learn more about \"Learning About Low-Carbohydrate Foods. \"     If you do not have an account, please click on the \"Sign Up Now\" link. Current as of: September 8, 2021               Content Version: 13.2  © 6096-7633 [employer]wise[employer] Inc[employer]. Care instructions adapted under license by [facility] ([facility]).  If you have questions about a medical condition or this instruction, always ask your healthcare professional. [place] [address] any warranty or liability for your use of this information.

## 2022-05-17 DIAGNOSIS — E78.00 PURE HYPERCHOLESTEROLEMIA: ICD-10-CM

## 2022-05-17 DIAGNOSIS — I10 ESSENTIAL HYPERTENSION: ICD-10-CM

## 2022-05-18 RX ORDER — GLIPIZIDE 10 MG/1
TABLET ORAL
Qty: 360 TABLET | Refills: 0 | Status: SHIPPED | OUTPATIENT
Start: 2022-05-18 | End: 2022-08-22

## 2022-05-18 RX ORDER — OLMESARTAN MEDOXOMIL 20 MG/1
TABLET ORAL
Qty: 90 TABLET | Refills: 0 | Status: SHIPPED | OUTPATIENT
Start: 2022-05-18 | End: 2022-08-22

## 2022-05-18 RX ORDER — SIMVASTATIN 20 MG
TABLET ORAL
Qty: 90 TABLET | Refills: 0 | Status: SHIPPED | OUTPATIENT
Start: 2022-05-18 | End: 2022-08-22

## 2022-05-18 NOTE — TELEPHONE ENCOUNTER
Spoke with patient and sugars are running 130-200. Patient currently taking metformin twice daily and glipizide 2AM, 2PM total of 4 daily. Appointment has been scheduled for 5/31/22.

## 2022-05-31 ENCOUNTER — OFFICE VISIT (OUTPATIENT)
Dept: FAMILY MEDICINE CLINIC | Age: 62
End: 2022-05-31
Payer: OTHER GOVERNMENT

## 2022-05-31 VITALS
WEIGHT: 212 LBS | OXYGEN SATURATION: 95 % | HEIGHT: 63 IN | BODY MASS INDEX: 37.56 KG/M2 | DIASTOLIC BLOOD PRESSURE: 74 MMHG | SYSTOLIC BLOOD PRESSURE: 108 MMHG | HEART RATE: 89 BPM | RESPIRATION RATE: 16 BRPM

## 2022-05-31 DIAGNOSIS — K42.9 UMBILICAL HERNIA WITHOUT OBSTRUCTION AND WITHOUT GANGRENE: ICD-10-CM

## 2022-05-31 DIAGNOSIS — Z86.010 HISTORY OF COLON POLYPS: ICD-10-CM

## 2022-05-31 DIAGNOSIS — E78.5 HYPERLIPIDEMIA, UNSPECIFIED HYPERLIPIDEMIA TYPE: ICD-10-CM

## 2022-05-31 DIAGNOSIS — R80.8 OTHER PROTEINURIA: ICD-10-CM

## 2022-05-31 DIAGNOSIS — E11.65 UNCONTROLLED TYPE 2 DIABETES MELLITUS WITH HYPERGLYCEMIA (HCC): Primary | ICD-10-CM

## 2022-05-31 DIAGNOSIS — E03.4 HYPOTHYROIDISM DUE TO ACQUIRED ATROPHY OF THYROID: ICD-10-CM

## 2022-05-31 LAB — HBA1C MFR BLD: 8.2 %

## 2022-05-31 PROCEDURE — 3052F HG A1C>EQUAL 8.0%<EQUAL 9.0%: CPT | Performed by: INTERNAL MEDICINE

## 2022-05-31 PROCEDURE — 83036 HEMOGLOBIN GLYCOSYLATED A1C: CPT | Performed by: INTERNAL MEDICINE

## 2022-05-31 PROCEDURE — 99214 OFFICE O/P EST MOD 30 MIN: CPT | Performed by: INTERNAL MEDICINE

## 2022-05-31 RX ORDER — LIRAGLUTIDE 6 MG/ML
INJECTION SUBCUTANEOUS
Qty: 2 PEN | Refills: 5 | Status: SHIPPED | OUTPATIENT
Start: 2022-05-31 | End: 2022-09-14 | Stop reason: SDUPTHER

## 2022-05-31 NOTE — PATIENT INSTRUCTIONS
I rec covid booster    Consider CT screening    Call gyn for mammogram and pap    Continue nice weight loss, low carb diet

## 2022-05-31 NOTE — PROGRESS NOTES
HPI: Jimi Ortega presents for follow-up diabetes      Chronic health issues are Ménière's, uncontrolled diabetes, BMI 38, colonic polyp, splenic aneurysm, hypothyroidism, hyperlipidemia, BPPV, umbilical hernia. UTI      DM2. A1c 9.5 March .  No retinopathy. + proteinuria.  sugars 140-300 victoza 1.8 daily . Jardiance cost prohibitive and  utis. Glipizide 20 twice daily. Metformin 1000 bid missing p.m. dose of medication frequently. Not using baby aspirin. As I recommended. Last eye exam 1 year ago. Denies any changes in vision. Is aware of the reason for screening. Positive statin, ARB.       Hypertension.  Echocardiogram  with ejection fraction 60%.     Benicar 20 .  No chest pain palpitations or new exercise intolerance.  No edema. LDL 70 on statin. Does not check her blood pressure. No chest pain palpitations or increased exercise intolerance      Ménière's with hearing loss and occasional vertigo. No issues today.       no abnormal pap. No STD concerns. No fh ovarian or breast cancer in family, pap  [facility].  .  Tubal pregnancy.  Due mammogram May 2021 has not scheduled     CT of the abdomen with  able splenic aneurysm. Daphene Enrique     Colonoscopy due May 2024 last with adenomatous polyp and hemorrhoids. No issues today.     Hypothyroid. Neg nodules. T4 1.2022 on supplement     60-pack-year tobacco.  Denies any wheeze hemoptysis shortness of breath. No CT screening today aware it is available    Specialist  GYN,  EYE  Dentiist  GI       PMH:    Cholecystectomy    Childbirth    Hx tubal pregnancy laparotomy      SH: . son bought their previous house and daughter living with him. [place] her new home.   70 hour week. . Is to retire .  Enjoys working at home.  Tobacco cessation 60 pack years discontinued 20 yr ago. Rare alcohol. Rare sex. Dyspareunia.  No drugs.  No exercise     FH: 2 sisters 1 brother    + DM,MGF suicide, dementia in 66's     - colon, breast, ovarina cancer.     Review of systems: Remote concussion. Cataract. No chronic sinus symptoms. Denies any wheezing pneumonias or abnormal chest x-rays. + Vertigo.  Denies emphysema 60-pack-year tobacco.. No chest pain palpitations or syncope. No breast masses. And acid for GE reflux no bloody stools. No kidney stones or recurrent bladder infections. Rare knee pain. Dyspareunia    Constitutional, ent, CV, respiratory, GI, , joint, skin, allergic and psychiatric ROS reviewed and negative except for above    Allergies   Allergen Reactions    Lisinopril      Cough/scratchy throat       Outpatient Medications Marked as Taking for the 5/31/22 encounter (Office Visit) with Liban Quiñones MD   Medication Sig Dispense Refill    olmesartan (BENICAR) 20 MG tablet TAKE ONE TABLET BY MOUTH DAILY 90 tablet 0    simvastatin (ZOCOR) 20 MG tablet TAKE ONE TABLET BY MOUTH EVERY NIGHT AT BEDTIME 90 tablet 0    metFORMIN (GLUCOPHAGE) 1000 MG tablet TAKE ONE TABLET BY MOUTH TWICE A DAY WITH MEALS 180 tablet 0    glipiZIDE (GLUCOTROL) 10 MG tablet TAKE 1 TO 2 TABLETS BY MOUTH BEFORE BREAKFAST AND 2 TABLETS BEFORE SUPPER 360 tablet 0    meclizine (ANTIVERT) 12.5 MG tablet 1 po tid prn spinning 30 tablet 0    levothyroxine (SYNTHROID) 112 MCG tablet TAKE ONE TABLET BY MOUTH DAILY 90 tablet 3    blood glucose monitor kit and supplies Test bid & as needed for symptoms of irregular blood glucose. 1 kit 0    blood glucose monitor strips Test bid & as needed for symptoms of irregular blood glucose.  100 strip 0    Biotin (BIOTIN 5000) 5 MG CAPS Take by mouth      vitamin D (CHOLECALCIFEROL) 1000 UNIT TABS tablet Take 1,000 Units by mouth daily               Past Medical History:   Diagnosis Date    GERD (gastroesophageal reflux disease)     History of colon polyps 1/14/2019    HYPERPLASTIC POLYP 2013    Hyperlipidemia     Hypothyroidism     Type II or unspecified type diabetes mellitus without mention of complication, not stated as uncontrolled        Past Surgical History:   Procedure Laterality Date     SECTION      x 4    CHOLECYSTECTOMY  10/12/2011    Dr. Donovan Pedraza performed              Family History   Problem Relation Age of Onset    Diabetes Father     High Blood Pressure Father     Heart Attack Paternal Grandfather              Objective     /74   Pulse 89   Resp 16   Ht 5' 3\" (1.6 m)   Wt 212 lb (96.2 kg)   LMP 2013   SpO2 95%   BMI 37.55 kg/m²     @LASTSAO2(3)@    Wt Readings from Last 3 Encounters:   22 215 lb (97.5 kg)   21 213 lb (96.6 kg)   21 214 lb (97.1 kg)       Physical Exam     NAD alert and cooperative  HEENT: TMs scar on left. Right normal.  No nystagmus throat is clear. Good upstroke of the carotids no bruits. Lungs are clear no wheezes rales or rhonchi. Cardiovascular exam regular rate and rhythm no murmur click. Abdomen is obese. No hepatosplenomegaly epigastric tenderness or mass no intertriginous rash. Umbilical hernia  Good pulses feet. Sensation is intact. No maceration. Good eye contact. Well-groomed. Appropriate.     Chemistry        Component Value Date/Time     2022 1210    K 4.0 2022 1210    CL 98 (L) 2022 1210    CO2 23 2022 1210    BUN 11 2022 1210    CREATININE 0.5 (L) 2022 1210        Component Value Date/Time    CALCIUM 10.0 2022 1210    ALKPHOS 113 2022 1210    AST 31 2022 1210    ALT 36 2022 1210    BILITOT 0.8 2022 1210            Lab Results   Component Value Date    WBC 13.1 (H) 2019    HGB 14.7 2019    HCT 42.4 2019    MCV 89.2 2019     2019     Lab Results   Component Value Date    LABA1C 9.5 2022     Lab Results   Component Value Date    .4 2017     Lab Results   Component Value Date    LABA1C 9.5 2022     No components found for: CHLPL  Lab Results   Component Value Date TRIG 291 (H) 03/31/2022    TRIG 357 (H) 03/01/2021    TRIG 385 (H) 02/06/2020     Lab Results   Component Value Date    HDL 40 03/31/2022    HDL 35 (L) 03/01/2021    HDL 35 (L) 02/06/2020     Lab Results   Component Value Date    LDLCALC 70 03/31/2022 [address] see below 03/01/2021 [address] see below 02/06/2020     Lab Results   Component Value Date    LABVLDL 58 03/31/2022    LABVLDL see below 03/01/2021    LABVLDL see below 02/06/2020       Old labs and records reviewed or requested  Discussed past lab and studies with patient    Diagnosis Orders   1. Uncontrolled type 2 diabetes mellitus with hyperglycemia (HCC)  POCT glycosylated hemoglobin (Hb A1C)   2. History of colon polyps     3. Hyperlipidemia, unspecified hyperlipidemia type     4. Hypothyroidism due to acquired atrophy of thyroid     5. Other proteinuria     6. Umbilical hernia without obstruction and without gangrene       Diabetes. Uncontrolled A1c is 8.3. Improved. This is a 2-month check. We will follow back up in 3 months. Continue exercise, weight loss, diet, current medications. Hypothyroidism TSH free T4. Hyperlipidemia lipid profile liver function test.  Normal last check    BPPV not an issue today    GE reflux see if we can switch from Prilosec to Pepcid    History of colonic polyps, GE reflux,    Interval health maintenance. Is aware of CT screening, Pap smear mammogram due advised on COVID-vaccine and pneumonia 23. On this date I have spent 40 minutes reviewing previous notes, test results, and face to face with the patient discussing the diagnosis and plan          No follow-ups on file. Diagnosis and treatment discussed.   Possible side effects of medication reviewed  Patients questions answered  Follow up understood  Pt aware if they are not contacted about any test results , this does not mean they are normal.  They should call

## 2022-06-24 ENCOUNTER — TELEPHONE (OUTPATIENT)
Dept: FAMILY MEDICINE CLINIC | Age: 62
End: 2022-06-24

## 2022-06-24 NOTE — TELEPHONE ENCOUNTER
Patient calling via [facility](Brittni) with complaints of numbness in hands and elbow from blood sugar dropping. Patient states she took all her medication last night then developed a migraine and lost vision then the numbness in hands and elbow started. She then ate a [place]ork peppermint cordell and she started to feel better. She denies rechecking her BS because she was at work. She checked her BS this morning and it was 134. She is scheduled 6/29 at 1:40pm      Spoke with Dr Jeramie Valiente regarding pt's concerns and was advised to tell patient if symptoms worsen to be evaluated at ED. Patient noted understanding.

## 2022-08-08 ENCOUNTER — TELEPHONE (OUTPATIENT)
Dept: FAMILY MEDICINE CLINIC | Age: 62
End: 2022-08-08

## 2022-08-08 ENCOUNTER — TELEMEDICINE (OUTPATIENT)
Dept: FAMILY MEDICINE CLINIC | Age: 62
End: 2022-08-08
Payer: OTHER GOVERNMENT

## 2022-08-08 DIAGNOSIS — E66.9 OBESITY, CLASS II, BMI 35-39.9: ICD-10-CM

## 2022-08-08 DIAGNOSIS — U07.1 COVID: Primary | ICD-10-CM

## 2022-08-08 DIAGNOSIS — E11.65 UNCONTROLLED TYPE 2 DIABETES MELLITUS WITH HYPERGLYCEMIA (HCC): ICD-10-CM

## 2022-08-08 DIAGNOSIS — E78.5 HYPERLIPIDEMIA, UNSPECIFIED HYPERLIPIDEMIA TYPE: ICD-10-CM

## 2022-08-08 PROBLEM — E66.812 OBESITY, CLASS II, BMI 35-39.9: Status: ACTIVE | Noted: 2022-08-08

## 2022-08-08 PROCEDURE — 99213 OFFICE O/P EST LOW 20 MIN: CPT | Performed by: INTERNAL MEDICINE

## 2022-08-08 PROCEDURE — 3052F HG A1C>EQUAL 8.0%<EQUAL 9.0%: CPT | Performed by: INTERNAL MEDICINE

## 2022-08-08 SDOH — ECONOMIC STABILITY: FOOD INSECURITY: WITHIN THE PAST 12 MONTHS, THE FOOD YOU BOUGHT JUST DIDN'T LAST AND YOU DIDN'T HAVE MONEY TO GET MORE.: NEVER TRUE

## 2022-08-08 SDOH — ECONOMIC STABILITY: FOOD INSECURITY: WITHIN THE PAST 12 MONTHS, YOU WORRIED THAT YOUR FOOD WOULD RUN OUT BEFORE YOU GOT MONEY TO BUY MORE.: NEVER TRUE

## 2022-08-08 ASSESSMENT — SOCIAL DETERMINANTS OF HEALTH (SDOH): HOW HARD IS IT FOR YOU TO PAY FOR THE VERY BASICS LIKE FOOD, HOUSING, MEDICAL CARE, AND HEATING?: NOT HARD AT ALL

## 2022-08-08 NOTE — PROGRESS NOTES
Destiny Govea, was evaluated through a synchronous (real-time) audio-video encounter. The patient (or guardian if applicable) is aware that this is a billable service, which includes applicable co-pays. This Virtual Visit was conducted with patient's (and/or legal guardian's) consent. The visit was conducted pursuant to the emergency declaration under the [facility], [address] [place] [address] authority and the [email or web address] General Act. Patient identification was verified, and a caregiver was present when appropriate. The patient was located in a state where the provider was licensed to provide care. Patient identification was verified at the start of the visit: Yes    Total time spent on this encounter: Not billed by time    Services were provided through a video synchronous discussion virtually to substitute for in-person clinic visit. Patient and provider were located at their individual homes. --Richard Schroeder MD on 8/8/2022 at 11:40 AM    An electronic signature was used to authenticate this note. HPI: Destiny Govea presents for respiratory illness. Chronic health issues are Ménière's, uncontrolled diabetes, BMI 38, colonic polyp, splenic aneurysm, hypothyroidism, hyperlipidemia, BPPV, umbilical hernia. UTI    Yesterday afternoon onset of chest congestion. + sputum production. No fever. No GI symptoms drinking. Liquids. Good urination. No shortness of breath with exertion. Pending COVID test.  Has had 3 COVID vaccines. No tobacco or diagnosis of asthma, however 60-pack-year tobacco.. BMI is 37, hyperlipidemia, diabetes, takes statin. Positive COVID test      DM2. A1c 8.2 May 2022. No retinopathy. + proteinuria.  sugars 140-300 victoza 1.8 daily . Jardiance cost prohibitive and  utis. Glipizide 20 twice daily. Metformin 1000 bid missing p.m. dose of medication frequently. Last eye exam 1 year ago. Denies any changes in vision. Is aware of the reason for screening. Positive statin, ARB. Hypertension. Echocardiogram  with ejection fraction 60%. Benicar 20 . No chest pain palpitations or new exercise intolerance. No edema. LDL 70 on statin. Does not check her blood pressure. No chest pain palpitations or increased exercise intolerance      Ménière's with hearing loss and occasional vertigo. No issues today.  no abnormal pap. No STD concerns. No fh ovarian or breast cancer in family, pap 2017 [place]. .  Tubal pregnancy. Due mammogram May 2021 has not scheduled     CT of the abdomen with  able splenic aneurysm. .       Colonoscopy due May 2024 last with adenomatous polyp and hemorrhoids. No issues today. Hypothyroid. Neg nodules. T4 1.2022 on supplement     60-pack-year tobacco.  Denies any chronic wheeze hemoptysis shortness of breath. No CT screening today. Aware it is available     Specialist  GYN,  EYE  Dentiist  GI      PMH:    Cholecystectomy    Childbirth    Hx tubal pregnancy laparotomy     SH: . son bought their previous house and daughter living with him. Likes her new home.  70 hour week. . Is to retire . Enjoys working at home. Tobacco cessation 60 pack years discontinued 20 yr ago. Rare alcohol. Rare sex. Dyspareunia. No drugs. No exercise     FH: 2 sisters 1 brother    + DM,MGF suicide, dementia in 66's     - colon, breast, ovarina cancer. Review of systems: Remote concussion. Cataract. No chronic sinus symptoms. Denies any wheezing pneumonias or abnormal chest x-rays. + Vertigo. Denies emphysema 60-pack-year tobacco.. No chest pain palpitations or syncope. No breast masses. And acid for GE reflux no bloody stools. No kidney stones or recurrent bladder infections. Rare knee pain.  Dyspareunia       Constitutional, ent, CV, respiratory, GI, , joint, skin, allergic and psychiatric ROS reviewed and negative except for above    Allergies   Allergen Reactions    Lisinopril      Cough/scratchy throat       Outpatient Medications Marked as Taking for the 22 encounter (Telemedicine) with Sachin Kaur MD   Medication Sig Dispense Refill    Liraglutide (VICTOZA) 18 MG/3ML SOPN SC injection DIAL AND INJECT UNDER THE SKIN 1.8 MG DAILY 2 pen 5    olmesartan (BENICAR) 20 MG tablet TAKE ONE TABLET BY MOUTH DAILY 90 tablet 0    simvastatin (ZOCOR) 20 MG tablet TAKE ONE TABLET BY MOUTH EVERY NIGHT AT BEDTIME 90 tablet 0    metFORMIN (GLUCOPHAGE) 1000 MG tablet TAKE ONE TABLET BY MOUTH TWICE A DAY WITH MEALS 180 tablet 0    glipiZIDE (GLUCOTROL) 10 MG tablet TAKE 1 TO 2 TABLETS BY MOUTH BEFORE BREAKFAST AND 2 TABLETS BEFORE SUPPER 360 tablet 0    meclizine (ANTIVERT) 12.5 MG tablet 1 po tid prn spinning 30 tablet 0    levothyroxine (SYNTHROID) 112 MCG tablet TAKE ONE TABLET BY MOUTH DAILY 90 tablet 3    blood glucose monitor kit and supplies Test bid & as needed for symptoms of irregular blood glucose. 1 kit 0    blood glucose monitor strips Test bid & as needed for symptoms of irregular blood glucose.  100 strip 0    Biotin 5 MG CAPS Take by mouth      vitamin D (CHOLECALCIFEROL) 1000 UNIT TABS tablet Take 1,000 Units by mouth daily               Past Medical History:   Diagnosis Date    GERD (gastroesophageal reflux disease)     History of colon polyps 2019    HYPERPLASTIC POLYP     Hyperlipidemia     Hypothyroidism     Type II or unspecified type diabetes mellitus without mention of complication, not stated as uncontrolled        Past Surgical History:   Procedure Laterality Date     SECTION      x 4    CHOLECYSTECTOMY  10/12/2011    Dr. Herminio Barton performed              Family History   Problem Relation Age of Onset    Diabetes Father     High Blood Pressure Father     Heart Attack Paternal Grandfather          Review of Systems      Chemistry        Component Value Date/Time     2022 1210    K 4.0 03/31/2022 1210    CL 98 (L) 03/31/2022 1210    CO2 23 03/31/2022 1210    BUN 11 03/31/2022 1210    CREATININE 0.5 (L) 03/31/2022 1210        Component Value Date/Time    CALCIUM 10.0 03/31/2022 1210    ALKPHOS 113 03/31/2022 1210    AST 31 03/31/2022 1210    ALT 36 03/31/2022 1210    BILITOT 0.8 03/31/2022 1210            NO vitals  as this was a virtual visit during cvod19 pandemic  Intermittent cough. She is appropriate. Slight tachypnea. Hoarse voice. Lab Results   Component Value Date    WBC 13.1 (H) 05/07/2019    HGB 14.7 05/07/2019    HCT 42.4 05/07/2019    MCV 89.2 05/07/2019     05/07/2019     Lab Results   Component Value Date    LABA1C 8.2 05/31/2022     Lab Results   Component Value Date    .4 08/17/2017     Lab Results   Component Value Date    LABA1C 8.2 05/31/2022     No components found for: CHLPL  Lab Results   Component Value Date    TRIG 291 (H) 03/31/2022    TRIG 357 (H) 03/01/2021    TRIG 385 (H) 02/06/2020     Lab Results   Component Value Date    HDL 40 03/31/2022    HDL 35 (L) 03/01/2021    HDL 35 (L) 02/06/2020     Lab Results   Component Value Date    LDLCALC 70 03/31/2022    [address] see below 03/01/2021    LDLCALC see below 02/06/2020     Lab Results   Component Value Date    LABVLDL 58 03/31/2022    LABVLDL see below 03/01/2021    LABVLDL see below 02/06/2020       Old labs and records reviewed or requested  Discussed past lab and studies with patient      Diagnosis Orders   1. COVID        2. Uncontrolled type 2 diabetes mellitus with hyperglycemia (HCC)        3. Obesity, Class II, BMI 35-39.9        4. Hyperlipidemia, unspecified hyperlipidemia type          Positive COVID. Has comorbidities increased risk. She is early within the first 5 days. Antivirals ordered. We will hold her simvastatin. We did not discuss obesity, hyperlipidemia, hypothyroidism, vertigo, diabetes, proteinuria, colon polyps today    No follow-ups on file.         Diagnosis and treatment discussed.   Possible side effects of medication reviewed  Patients questions answered  Follow up understood  Pt aware if they are not contacted about any test results , this does not mean they are normal.  They should call

## 2022-08-08 NOTE — TELEPHONE ENCOUNTER
Pt called and stated she started with a cough yesterday and it went right into her chest, it hurts when she coughs. Pt believes it is bronchitis. Pt has gunk when she cough. Pt is reachable [phone].

## 2022-08-09 ENCOUNTER — TELEPHONE (OUTPATIENT)
Dept: FAMILY MEDICINE CLINIC | Age: 62
End: 2022-08-09

## 2022-08-09 NOTE — TELEPHONE ENCOUNTER
Patient says she feels a little better today but is still coughing and with headache. Letter mailed per patient request as I cannot email PHI.

## 2022-08-09 NOTE — TELEPHONE ENCOUNTER
----- Message from Alicia Lianet sent at 8/9/2022  1:06 PM EDT -----  Subject: Message to Provider    QUESTIONS  Information for Provider? PT needs a note for work stating that she is off   this week due to her COID diagnosis. PT would like to have the note   emailed to her if possible at [email or web address]. PT also wants to know if   5 days are enough since she has tested positive to go back to work. Please   reach out to the PT to discuss. ---------------------------------------------------------------------------  --------------  [facility]  [phone]; OK to leave message on voicemail  ---------------------------------------------------------------------------  --------------  SCRIPT ANSWERS  Relationship to Patient?  Self

## 2022-08-09 NOTE — TELEPHONE ENCOUNTER
Have drafted letter. Please discuss with pt. If issues let me know. How is she doing today.  Remind not to use simvastatin

## 2022-08-22 DIAGNOSIS — I10 ESSENTIAL HYPERTENSION: ICD-10-CM

## 2022-08-22 DIAGNOSIS — E78.00 PURE HYPERCHOLESTEROLEMIA: ICD-10-CM

## 2022-08-22 RX ORDER — SIMVASTATIN 20 MG
TABLET ORAL
Qty: 90 TABLET | Refills: 0 | Status: SHIPPED | OUTPATIENT
Start: 2022-08-22 | End: 2022-09-14 | Stop reason: SDUPTHER

## 2022-08-22 RX ORDER — GLIPIZIDE 10 MG/1
TABLET ORAL
Qty: 360 TABLET | Refills: 0 | Status: SHIPPED | OUTPATIENT
Start: 2022-08-22 | End: 2022-09-14 | Stop reason: SDUPTHER

## 2022-08-22 RX ORDER — OLMESARTAN MEDOXOMIL 20 MG/1
TABLET ORAL
Qty: 90 TABLET | Refills: 0 | Status: SHIPPED | OUTPATIENT
Start: 2022-08-22 | End: 2022-09-14 | Stop reason: SDUPTHER

## 2022-09-13 NOTE — PROGRESS NOTES
HPI: Marian Lovelace presents for follow-up diabetes      Chronic health issues are Ménière's, uncontrolled diabetes, BMI 45, colonic polyp, splenic aneurysm, hypothyroidism, hyperlipidemia, BPPV, umbilical hernia. UTI      DM2. A1c 8.2  . No retinopathy with appointment greater than 1 year ago. .+ proteinuria.  sugars 130-200 victoza 1.8 daily . Jardiance cost prohibitive and  utis. Glipizide 20 twice daily. 2-3 lows this month. Metformin 1000 bid better compliance. Not using baby aspirin. Denies any changes in vision. Positive statin, ARB. LDL 70 3.2022 he does not check blood pressure    Hypertension. Echocardiogram  with ejection fraction 60%. Benicar 20 . LDL 70 on statin. Does not check her blood pressure. No chest pain palpitations or lower extremity edema. No increased exercise intolerance      Ménière's with hearing loss and occasional vertigo. As needed meclizine       no abnormal pap. No STD concerns. No fh ovarian or breast cancer in family, pap  [place]. .  Tubal pregnancy. Due mammogram May 2021 has not scheduled no STD concerns or abnormal Paps. Minimal sexual activity     CT of the abdomen with  able splenic aneurysm. .       Colonoscopy due May 2024 last with adenomatous polyp and diverticuli and hemorrhoids. Recheck 2029 with Dr. Navin Tellez     hypothyroid. Neg nodules. T4 1.2022 on supplement     60-pack-year tobacco.  Denies any wheeze hemoptysis shortness of breath. No CT screening today aware it is available    Specialist  EYE  Dentiist  GI       PMH:    Cholecystectomy    Childbirth    Hx tubal pregnancy laparotomy      SH: . daughter to move back in. Likes her new home.  decreased from 70 to 40 hour week. . Is to retire . Enjoys working at home. Tobacco cessation 60 pack years discontinued 20 yr ago. Rare alcohol. Rare sex. Dyspareunia. No drugs.   No exercise     FH: 2 sisters 1 brother    + DM,MGF suicide, dementia in 66's     - colon, breast, ovarina cancer. Review of systems: Remote concussion. Cataract. No chronic sinus symptoms. Denies any wheezing pneumonias or abnormal chest x-rays. + Vertigo. Denies emphysema 60-pack-year tobacco.. No chest pain palpitations or syncope. No breast masses. And acid for GE reflux no bloody stools. No kidney stones or recurrent bladder infections. Rare knee pain. Dyspareunia    Constitutional, ent, CV, respiratory, GI, , joint, skin, allergic and psychiatric ROS reviewed and negative except for above    Allergies   Allergen Reactions    Lisinopril      Cough/scratchy throat       Outpatient Medications Marked as Taking for the 9/14/22 encounter (Office Visit) with Fide Bella MD   Medication Sig Dispense Refill    olmesartan (BENICAR) 20 MG tablet TAKE ONE TABLET BY MOUTH DAILY 90 tablet 0    simvastatin (ZOCOR) 20 MG tablet TAKE ONE TABLET BY MOUTH EVERY NIGHT AT BEDTIME 90 tablet 0    metFORMIN (GLUCOPHAGE) 1000 MG tablet TAKE ONE TABLET BY MOUTH TWICE A DAY WITH MEALS 180 tablet 0    glipiZIDE (GLUCOTROL) 10 MG tablet TAKE ONE TO TWO TABLETS BY MOUTH EVERY DAY BEFORE BREAKFAST AND 2 TABLETS BY MOUTH BEFORE SUPPER 360 tablet 0    Liraglutide (VICTOZA) 18 MG/3ML SOPN SC injection DIAL AND INJECT UNDER THE SKIN 1.8 MG DAILY 2 pen 5    meclizine (ANTIVERT) 12.5 MG tablet 1 po tid prn spinning 30 tablet 0    levothyroxine (SYNTHROID) 112 MCG tablet TAKE ONE TABLET BY MOUTH DAILY 90 tablet 3    blood glucose monitor kit and supplies Test bid & as needed for symptoms of irregular blood glucose. 1 kit 0    blood glucose monitor strips Test bid & as needed for symptoms of irregular blood glucose.  100 strip 0    Biotin 5 MG CAPS Take by mouth      vitamin D (CHOLECALCIFEROL) 1000 UNIT TABS tablet Take 1,000 Units by mouth daily               Past Medical History:   Diagnosis Date    GERD (gastroesophageal reflux disease)     History of colon polyps 1/14/2019 HYPERPLASTIC POLYP     Hyperlipidemia     Hypothyroidism     Obesity, Class II, BMI 35-39.9 2022    Type II or unspecified type diabetes mellitus without mention of complication, not stated as uncontrolled        Past Surgical History:   Procedure Laterality Date     SECTION      x 4    CHOLECYSTECTOMY  10/12/2011    Dr. Kirk Turner performed              Family History   Problem Relation Age of Onset    Diabetes Father     High Blood Pressure Father     Heart Attack Paternal Grandfather              Objective     /78   Pulse 76   Resp 12   Ht 5' 3\" (1.6 m)   Wt 210 lb (95.3 kg)   LMP 2013   SpO2 96%   BMI 37.20 kg/m²     @LASTSAO2(3)@    Wt Readings from Last 3 Encounters:   22 212 lb (96.2 kg)   22 215 lb (97.5 kg)   21 213 lb (96.6 kg)       Physical Exam     NAD alert and cooperative  HEENT: TMs scar on left. Right normal.  No nystagmus throat is clear. Good upstroke of the carotids no bruits. Lungs are clear no wheezes rales or rhonchi. Cardiovascular exam regular rate and rhythm no murmur click. Abdomen is obese. No hepatosplenomegaly epigastric tenderness or mass no intertriginous rash. Umbilical hernia  Good pulses feet. Sensation is intact. No maceration. Good eye contact. Well-groomed. Appropriate.     Chemistry        Component Value Date/Time     2022 1210    K 4.0 2022 1210    CL 98 (L) 2022 1210    CO2 23 2022 1210    BUN 11 2022 1210    CREATININE 0.5 (L) 2022 1210        Component Value Date/Time    CALCIUM 10.0 2022 1210    ALKPHOS 113 2022 1210    AST 31 2022 1210    ALT 36 2022 1210    BILITOT 0.8 2022 1210            Lab Results   Component Value Date    WBC 13.1 (H) 2019    HGB 14.7 2019    HCT 42.4 2019    MCV 89.2 2019     2019     Lab Results   Component Value Date    LABA1C 8.2 2022     Lab Results   Component Value Date    .4 08/17/2017     Lab Results   Component Value Date    LABA1C 8.2 05/31/2022     No components found for: CHLPL  Lab Results   Component Value Date    TRIG 291 (H) 03/31/2022    TRIG 357 (H) 03/01/2021    TRIG 385 (H) 02/06/2020     Lab Results   Component Value Date    HDL 40 03/31/2022    HDL 35 (L) 03/01/2021    HDL 35 (L) 02/06/2020     Lab Results   Component Value Date    LDLCALC 70 03/31/2022    [address] see below 03/01/2021    LDLCALC see below 02/06/2020     Lab Results   Component Value Date    LABVLDL 58 03/31/2022    LABVLDL see below 03/01/2021    LABVLDL see below 02/06/2020     Lab Results   Component Value Date    LABA1C 7.5 09/14/2022     Lab Results   Component Value Date    .4 08/17/2017         No follow-ups on file. Diagnosis Orders   1. Uncontrolled type 2 diabetes mellitus with hyperglycemia (HCC)  POCT glycosylated hemoglobin (Hb A1C)    Comprehensive Metabolic Panel      2. History of colon polyps        3. Benign paroxysmal positional vertigo of right ear        4. Gastroesophageal reflux disease without esophagitis        5. Hypothyroidism due to acquired atrophy of thyroid        6. Hyperlipidemia, unspecified hyperlipidemia type  Lipid Panel      7. Need for influenza vaccination  Influenza, FLUCELVAX, (age 10 mo+), IM, Preservative Free, 0.5 mL      8. Encounter for screening mammogram for malignant neoplasm of breast  [facility] DIGITAL SCREEN W OR WO CAD BILATERAL      9. Screening for cervical cancer  PAP SMEAR        Diabetes significant improvement. Continue on with her diet, exercise check eye exams follow-up in 3 months. Information on Januvia. History of colonic polyps recheck colonoscopy 2029. Vertigo scarring left tympanic membrane. Meclizine. Tinnitus. GE reflux controlled with antiacid. Hypothyroidism. Laboratories ordered. Hyperlipidemia. No known coronary disease.     General health maintenance Pap smear order for mammogram flu vaccine given. Discussed COVID-vaccine and pneumonia    Today I spent more than 40 minutes on review of chart, patient management and face-to-face patient    Diagnosis and treatment discussed.   Possible side effects of medication reviewed  Patients questions answered  Follow up understood  Pt aware if they are not contacted about any test results , this does not mean they are normal.  They should call  Review of Systems

## 2022-09-14 ENCOUNTER — OFFICE VISIT (OUTPATIENT)
Dept: FAMILY MEDICINE CLINIC | Age: 62
End: 2022-09-14
Payer: OTHER GOVERNMENT

## 2022-09-14 VITALS
RESPIRATION RATE: 12 BRPM | BODY MASS INDEX: 37.21 KG/M2 | OXYGEN SATURATION: 96 % | WEIGHT: 210 LBS | HEART RATE: 76 BPM | SYSTOLIC BLOOD PRESSURE: 130 MMHG | DIASTOLIC BLOOD PRESSURE: 78 MMHG | HEIGHT: 63 IN

## 2022-09-14 DIAGNOSIS — E78.5 HYPERLIPIDEMIA, UNSPECIFIED HYPERLIPIDEMIA TYPE: ICD-10-CM

## 2022-09-14 DIAGNOSIS — K21.9 GASTROESOPHAGEAL REFLUX DISEASE WITHOUT ESOPHAGITIS: ICD-10-CM

## 2022-09-14 DIAGNOSIS — H81.11 BENIGN PAROXYSMAL POSITIONAL VERTIGO OF RIGHT EAR: ICD-10-CM

## 2022-09-14 DIAGNOSIS — E11.65 UNCONTROLLED TYPE 2 DIABETES MELLITUS WITH HYPERGLYCEMIA (HCC): Primary | ICD-10-CM

## 2022-09-14 DIAGNOSIS — Z12.31 ENCOUNTER FOR SCREENING MAMMOGRAM FOR MALIGNANT NEOPLASM OF BREAST: ICD-10-CM

## 2022-09-14 DIAGNOSIS — Z23 NEED FOR INFLUENZA VACCINATION: ICD-10-CM

## 2022-09-14 DIAGNOSIS — E78.00 PURE HYPERCHOLESTEROLEMIA: ICD-10-CM

## 2022-09-14 DIAGNOSIS — E03.4 HYPOTHYROIDISM DUE TO ACQUIRED ATROPHY OF THYROID: ICD-10-CM

## 2022-09-14 DIAGNOSIS — Z86.010 HISTORY OF COLON POLYPS: ICD-10-CM

## 2022-09-14 DIAGNOSIS — I10 ESSENTIAL HYPERTENSION: ICD-10-CM

## 2022-09-14 DIAGNOSIS — Z12.4 SCREENING FOR CERVICAL CANCER: ICD-10-CM

## 2022-09-14 LAB
A/G RATIO: 1.9 (ref 1.1–2.2)
ALBUMIN SERPL-MCNC: 4.5 G/DL (ref 3.4–5)
ALP BLD-CCNC: 106 U/L (ref 40–129)
ALT SERPL-CCNC: 26 U/L (ref 10–40)
ANION GAP SERPL CALCULATED.3IONS-SCNC: 17 MMOL/L (ref 3–16)
AST SERPL-CCNC: 30 U/L (ref 15–37)
BILIRUB SERPL-MCNC: 0.8 MG/DL (ref 0–1)
BUN BLDV-MCNC: 7 MG/DL (ref 7–20)
CALCIUM SERPL-MCNC: 8.9 MG/DL (ref 8.3–10.6)
CHLORIDE BLD-SCNC: 104 MMOL/L (ref 99–110)
CHOLESTEROL, TOTAL: 163 MG/DL (ref 0–199)
CO2: 24 MMOL/L (ref 21–32)
CREAT SERPL-MCNC: 0.6 MG/DL (ref 0.6–1.2)
GFR AFRICAN AMERICAN: >60
GFR NON-AFRICAN AMERICAN: >60
GLUCOSE BLD-MCNC: 113 MG/DL (ref 70–99)
HBA1C MFR BLD: 7.5 %
HDLC SERPL-MCNC: 40 MG/DL (ref 40–60)
LDL CHOLESTEROL CALCULATED: ABNORMAL MG/DL
LDL CHOLESTEROL DIRECT: 73 MG/DL
POTASSIUM SERPL-SCNC: 4 MMOL/L (ref 3.5–5.1)
SODIUM BLD-SCNC: 145 MMOL/L (ref 136–145)
TOTAL PROTEIN: 6.9 G/DL (ref 6.4–8.2)
TRIGL SERPL-MCNC: 369 MG/DL (ref 0–150)
VLDLC SERPL CALC-MCNC: ABNORMAL MG/DL

## 2022-09-14 PROCEDURE — 83036 HEMOGLOBIN GLYCOSYLATED A1C: CPT | Performed by: INTERNAL MEDICINE

## 2022-09-14 PROCEDURE — 36415 COLL VENOUS BLD VENIPUNCTURE: CPT | Performed by: INTERNAL MEDICINE

## 2022-09-14 PROCEDURE — 90674 CCIIV4 VAC NO PRSV 0.5 ML IM: CPT | Performed by: INTERNAL MEDICINE

## 2022-09-14 PROCEDURE — 90471 IMMUNIZATION ADMIN: CPT | Performed by: INTERNAL MEDICINE

## 2022-09-14 PROCEDURE — 3051F HG A1C>EQUAL 7.0%<8.0%: CPT | Performed by: INTERNAL MEDICINE

## 2022-09-14 PROCEDURE — 99214 OFFICE O/P EST MOD 30 MIN: CPT | Performed by: INTERNAL MEDICINE

## 2022-09-14 RX ORDER — SIMVASTATIN 20 MG
TABLET ORAL
Qty: 90 TABLET | Refills: 1 | Status: SHIPPED | OUTPATIENT
Start: 2022-09-14

## 2022-09-14 RX ORDER — LIRAGLUTIDE 6 MG/ML
INJECTION SUBCUTANEOUS
Qty: 2 ADJUSTABLE DOSE PRE-FILLED PEN SYRINGE | Refills: 5 | Status: SHIPPED | OUTPATIENT
Start: 2022-09-14 | End: 2022-09-21 | Stop reason: SDUPTHER

## 2022-09-14 RX ORDER — OLMESARTAN MEDOXOMIL 20 MG/1
TABLET ORAL
Qty: 90 TABLET | Refills: 1 | Status: SHIPPED | OUTPATIENT
Start: 2022-09-14

## 2022-09-14 RX ORDER — GLIPIZIDE 10 MG/1
TABLET ORAL
Qty: 360 TABLET | Refills: 0 | Status: SHIPPED | OUTPATIENT
Start: 2022-09-14

## 2022-09-14 NOTE — PATIENT INSTRUCTIONS
I recommended pneumonia vaccine and a COVID booster. Call for your mammogram [phone]  Consider the low dose CT screen for lung cancer. A1C is down to 7.5. goal less than 7. I have attached information on januvia, which is an additional diabetic medication which can continue to lower sugars. I recommend using a baby asprin daily and follow up with your eye doctor  No more white castles.      Call for new pcp follow up 3 months    MD Gagandeep Rubin MD  [facility] internal Medicine  [id number] [facility] [address]  [address] scheduling  [phone]

## 2022-09-14 NOTE — PROGRESS NOTES
Vaccine Information Sheet, \"Influenza - Inactivated\"  given to Destiny Homes, or parent/legal guardian of  Destiny Homes and verbalized understanding. Patient responses:    Have you received any other vaccine within the last 14 days? No  Do you currently have an active infectious or acute respiratory illness or fever? No  Have you ever had a reaction to a flu vaccine? No  Do you have any current illness? No  Have you ever had [place] Syndrome? No  Do you have a serious allergy to any of the follow: Neomycin, Polymyxin, Thimerosal, eggs or egg products? No      Flu vaccine given per order. Please see immunization tab. Risks and benefits explained. Current VIS given.       Immunizations Administered       Name Date Dose Route    Influenza, FLUCELVAX, (age 10 mo+), MDCK, PF, 0.5mL 9/14/2022 0.5 mL Intramuscular    Site: Deltoid- Left    Lot: [id number]    NDC: 86789-143-22

## 2022-09-15 LAB — TSH REFLEX FT4: 1.25 UIU/ML (ref 0.27–4.2)

## 2022-09-17 DIAGNOSIS — E03.4 HYPOTHYROIDISM DUE TO ACQUIRED ATROPHY OF THYROID: ICD-10-CM

## 2022-09-21 RX ORDER — LIRAGLUTIDE 6 MG/ML
INJECTION SUBCUTANEOUS
Qty: 9 ADJUSTABLE DOSE PRE-FILLED PEN SYRINGE | Refills: 5 | Status: SHIPPED | OUTPATIENT
Start: 2022-09-21

## 2022-11-08 ENCOUNTER — HOSPITAL ENCOUNTER (EMERGENCY)
Age: 62
Discharge: HOME | End: 2022-11-08
Attending: EMERGENCY MEDICINE
Payer: OTHER GOVERNMENT

## 2022-11-08 ENCOUNTER — APPOINTMENT (OUTPATIENT)
Dept: GENERAL RADIOLOGY | Age: 62
End: 2022-11-08
Payer: OTHER GOVERNMENT

## 2022-11-08 VITALS
HEART RATE: 90 BPM | OXYGEN SATURATION: 94 % | DIASTOLIC BLOOD PRESSURE: 67 MMHG | RESPIRATION RATE: 14 BRPM | SYSTOLIC BLOOD PRESSURE: 116 MMHG | TEMPERATURE: 98.2 F

## 2022-11-08 DIAGNOSIS — R42 LIGHTHEADEDNESS: Primary | ICD-10-CM

## 2022-11-08 LAB
ANION GAP SERPL CALCULATED.3IONS-SCNC: 13 MMOL/L (ref 3–16)
BASOPHILS ABSOLUTE: 0.1 K/UL (ref 0–0.2)
BASOPHILS RELATIVE PERCENT: 0.7 %
BUN BLDV-MCNC: 13 MG/DL (ref 7–20)
CALCIUM SERPL-MCNC: 10.2 MG/DL (ref 8.3–10.6)
CHLORIDE BLD-SCNC: 98 MMOL/L (ref 99–110)
CO2: 23 MMOL/L (ref 21–32)
CREAT SERPL-MCNC: 0.6 MG/DL (ref 0.6–1.2)
EOSINOPHILS ABSOLUTE: 0.4 K/UL (ref 0–0.6)
EOSINOPHILS RELATIVE PERCENT: 4.2 %
GFR SERPL CREATININE-BSD FRML MDRD: >60 ML/MIN/{1.73_M2}
GLUCOSE BLD-MCNC: 346 MG/DL (ref 70–99)
HCT VFR BLD CALC: 43 % (ref 36–48)
HEMOGLOBIN: 14.8 G/DL (ref 12–16)
LYMPHOCYTES ABSOLUTE: 3.8 K/UL (ref 1–5.1)
LYMPHOCYTES RELATIVE PERCENT: 45 %
MAGNESIUM: 1.3 MG/DL (ref 1.8–2.4)
MCH RBC QN AUTO: 30.5 PG (ref 26–34)
MCHC RBC AUTO-ENTMCNC: 34.4 G/DL (ref 31–36)
MCV RBC AUTO: 88.5 FL (ref 80–100)
MONOCYTES ABSOLUTE: 0.7 K/UL (ref 0–1.3)
MONOCYTES RELATIVE PERCENT: 7.9 %
NEUTROPHILS ABSOLUTE: 3.6 K/UL (ref 1.7–7.7)
NEUTROPHILS RELATIVE PERCENT: 42.2 %
PDW BLD-RTO: 13.9 % (ref 12.4–15.4)
PHOSPHORUS: 4 MG/DL (ref 2.5–4.9)
PLATELET # BLD: 199 K/UL (ref 135–450)
PMV BLD AUTO: 8.6 FL (ref 5–10.5)
POTASSIUM REFLEX MAGNESIUM: 4.4 MMOL/L (ref 3.5–5.1)
RBC # BLD: 4.86 M/UL (ref 4–5.2)
SODIUM BLD-SCNC: 134 MMOL/L (ref 136–145)
TROPONIN: <0.01 NG/ML
WBC # BLD: 8.5 K/UL (ref 4–11)

## 2022-11-08 PROCEDURE — 83735 ASSAY OF MAGNESIUM: CPT

## 2022-11-08 PROCEDURE — 84100 ASSAY OF PHOSPHORUS: CPT

## 2022-11-08 PROCEDURE — 84484 ASSAY OF TROPONIN QUANT: CPT

## 2022-11-08 PROCEDURE — 6360000002 HC RX W HCPCS: Performed by: NURSE PRACTITIONER

## 2022-11-08 PROCEDURE — 71046 X-RAY EXAM CHEST 2 VIEWS: CPT

## 2022-11-08 PROCEDURE — 96365 THER/PROPH/DIAG IV INF INIT: CPT

## 2022-11-08 PROCEDURE — 2580000003 HC RX 258: Performed by: NURSE PRACTITIONER

## 2022-11-08 PROCEDURE — 6370000000 HC RX 637 (ALT 250 FOR IP): Performed by: NURSE PRACTITIONER

## 2022-11-08 PROCEDURE — 85025 COMPLETE CBC W/AUTO DIFF WBC: CPT

## 2022-11-08 PROCEDURE — 80048 BASIC METABOLIC PNL TOTAL CA: CPT

## 2022-11-08 PROCEDURE — 93005 ELECTROCARDIOGRAM TRACING: CPT | Performed by: NURSE PRACTITIONER

## 2022-11-08 PROCEDURE — 99284 EMERGENCY DEPT VISIT MOD MDM: CPT

## 2022-11-08 RX ORDER — LANOLIN ALCOHOL/MO/W.PET/CERES
400 CREAM (GRAM) TOPICAL ONCE
Status: COMPLETED | OUTPATIENT
Start: 2022-11-08 | End: 2022-11-08

## 2022-11-08 RX ORDER — SODIUM CHLORIDE, SODIUM LACTATE, POTASSIUM CHLORIDE, AND CALCIUM CHLORIDE .6; .31; .03; .02 G/100ML; G/100ML; G/100ML; G/100ML
1000 INJECTION, SOLUTION INTRAVENOUS ONCE
Status: COMPLETED | OUTPATIENT
Start: 2022-11-08 | End: 2022-11-08

## 2022-11-08 RX ORDER — MAGNESIUM SULFATE IN WATER 40 MG/ML
2000 INJECTION, SOLUTION INTRAVENOUS ONCE
Status: COMPLETED | OUTPATIENT
Start: 2022-11-08 | End: 2022-11-08

## 2022-11-08 RX ADMIN — SODIUM CHLORIDE, POTASSIUM CHLORIDE, SODIUM LACTATE AND CALCIUM CHLORIDE 1000 ML: 600; 310; 30; 20 INJECTION, SOLUTION INTRAVENOUS at 21:58

## 2022-11-08 RX ADMIN — Medication 400 MG: at 21:52

## 2022-11-08 RX ADMIN — MAGNESIUM SULFATE HEPTAHYDRATE 2000 MG: 40 INJECTION, SOLUTION INTRAVENOUS at 21:57

## 2022-11-08 NOTE — ED TRIAGE NOTES
Patient presents to ED with c/o shortness of breath since Friday. States she was just walking to her mothers and got short of breath.  Patient able to speak in complete sentences, SP02 WNL, no acute distress

## 2022-11-09 LAB
EKG ATRIAL RATE: 86 BPM
EKG DIAGNOSIS: NORMAL
EKG P AXIS: 68 DEGREES
EKG P-R INTERVAL: 152 MS
EKG Q-T INTERVAL: 348 MS
EKG QRS DURATION: 78 MS
EKG QTC CALCULATION (BAZETT): 416 MS
EKG R AXIS: 65 DEGREES
EKG T AXIS: 58 DEGREES
EKG VENTRICULAR RATE: 86 BPM

## 2022-11-09 NOTE — ED NOTES
Patient prepared for and ready to be discharged. Patient discharged at this time to home in care of self in no acute distress after verbalizing understanding of discharge instructions. Patient left after receiving After Visit Summary instructions. IV removed prior to discharge.       Nichole Arredondo RN  11/08/22 [id number]

## 2022-11-09 NOTE — DISCHARGE INSTRUCTIONS
You were seen in the emergency department with lightheadedness  You were found to have low magnesium  Please follow up with cardiology and primary care. You likely need a holter monitor, echo and possible outpatient stress test to further evaluate your symptoms.  Please call for appointments as soon as possible  You should return to the emergency department for new or worsening symptoms including chest pain, shortness of breath, palpations, passing out or near passing out or any other concerning complaint

## 2022-11-09 NOTE — ED PROVIDER NOTES
[address] ENCOUNTER          NURSE PRACTITIONER NOTE     Date of evaluation: 2022    Chief Complaint     Shortness of Breath      History of Present Illness     Sandra Diggs is a 65 y.o. female with a PMH of DM, HLD, Obesity who presents to the ED with complaints of episodes of lightheadedness that have occurred over the last 2 to 3 days. She states had about 12 episodes total.  Happen with both rest and exertion, and both laying, standing, and seated position. Have been totally random. States the feeling of lightheadedness comes on and she feels like she is \"floating\". Feels very spacey and lightheaded, then feels as if she could pass out. She denies any associated chest pain or palpitations with the episode but does states she feels mildly short of breath. 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 She denies any associated vomiting or diarrhea or poor p.o. intake. She denies any feeling of spinning. Denies any severe or sudden onset headache, vision changes, numbness, weakness. With the exception of the above, there are no aggravating or alleviating factors.     Review of Systems     Pertinent positive and negative findings as documented above in HPI, otherwise all other systems were reviewed and negative     Past Medical, Surgical, Family, and Social History     Medical History:   Past Medical History:   Diagnosis Date    GERD (gastroesophageal reflux disease)     History of colon polyps 2019    HYPERPLASTIC POLYP     Hyperlipidemia     Hypothyroidism     Obesity, Class II, BMI 35-39.9 2022    Type II or unspecified type diabetes mellitus without mention of complication, not stated as uncontrolled        Surgical History:   Past Surgical History:   Procedure Laterality Date     SECTION      x 4    CHOLECYSTECTOMY  10/12/2011    Dr. Melany Mack performed Social History: She reports that she quit smoking about 29 years ago. Her smoking use included cigarettes. She has a 180.00 pack-year smoking history. She has quit using smokeless tobacco. She reports current alcohol use. She reports that she does not use drugs. Family History: Her family history includes Diabetes in her father; Heart Attack in her paternal grandfather; High Blood Pressure in her father. Medications     Discharge Medication List as of 11/8/2022 11:23 PM        CONTINUE these medications which have NOT CHANGED    Details   Liraglutide (VICTOZA) 18 MG/3ML SOPN SC injection DIAL AND INJECT UNDER THE SKIN 1.8 MG DAILY, Disp-9 Adjustable Dose Pre-filled Pen Syringe, R-5Normal      olmesartan (BENICAR) 20 MG tablet TAKE ONE TABLET BY MOUTH DAILY, Disp-90 tablet, R-1Normal      simvastatin (ZOCOR) 20 MG tablet TAKE ONE TABLET BY MOUTH EVERY NIGHT AT BEDTIME, Disp-90 tablet, R-1Normal      metFORMIN (GLUCOPHAGE) 1000 MG tablet TAKE ONE TABLET BY MOUTH TWICE A DAY WITH MEALS, Disp-180 tablet, R-1Normal      glipiZIDE (GLUCOTROL) 10 MG tablet TAKE ONE TO TWO TABLETS BY MOUTH EVERY DAY BEFORE BREAKFAST AND 2 TABLETS BY MOUTH BEFORE SUPPER, Disp-360 tablet, R-0Normal      meclizine (ANTIVERT) 12.5 MG tablet 1 po tid prn spinning, Disp-30 tablet, R-0Normal      levothyroxine (SYNTHROID) 112 MCG tablet TAKE ONE TABLET BY MOUTH DAILY, Disp-90 tablet, R-3Normal      blood glucose monitor kit and supplies Test bid & as needed for symptoms of irregular blood glucose., Disp-1 kit, R-0, Normal      blood glucose monitor strips Test bid & as needed for symptoms of irregular blood glucose., Disp-100 strip, R-0, Normal      Biotin 5 MG CAPS Take by mouthHistorical Med      vitamin D (CHOLECALCIFEROL) 1000 UNIT TABS tablet Take 1,000 Units by mouth dailyHistorical Med             Allergies     Allergies:    Allergies as of 11/08/2022 - Fully Reviewed 09/14/2022   Allergen Reaction Noted    Lisinopril  05/18/2017 Physical Exam     INITIAL VITALS: BP: 128/78, Temp: 98.2 °F (36.8 °C), Heart Rate: (!) 106, Resp: (!) 9, SpO2: 95 %     Physical Exam  Constitutional:       Appearance: She is well-developed. HENT:      Head: Normocephalic and atraumatic. Cardiovascular:      Rate and Rhythm: Normal rate and regular rhythm. Pulmonary:      Effort: Pulmonary effort is normal.      Breath sounds: Normal breath sounds. Abdominal:      General: There is no distension. Palpations: Abdomen is soft. Musculoskeletal:         General: Normal range of motion. Cervical back: Normal range of motion. Skin:     General: Skin is warm and dry. Neurological:      Mental Status: She is alert and oriented to person, place, and time. Psychiatric:         Behavior: Behavior normal.       Diagnostic Results     EKG:  Seen and interpreted by myself and my attending Dr. Yair Cooper  Indication: lightheadednes  Rhythm: NSR  Rate: 86  Axis: normal  QRS: 78   QT/QTC: 348/416  ST Segments: no elevation or depression  T waves: no inversions  Impression: Sinus rhythm, inferior Q waves, table from prior EKG from 2018, July 31    RADIOLOGY:  XR CHEST (2 VW)   Final Result      No acute disease.                       LABS:   Results for orders placed or performed during the hospital encounter of 11/08/22   BMP w/ Reflex to MG   Result Value Ref Range    Sodium 134 (L) 136 - 145 mmol/L    Potassium reflex Magnesium 4.4 3.5 - 5.1 mmol/L    Chloride 98 (L) 99 - 110 mmol/L    CO2 23 21 - 32 mmol/L    Anion Gap 13 3 - 16    Glucose 346 (H) 70 - 99 mg/dL    BUN 13 7 - 20 mg/dL    Creatinine 0.6 0.6 - 1.2 mg/dL    Est, Glom Filt Rate >60 >60    Calcium 10.2 8.3 - 10.6 mg/dL   CBC with Auto Differential   Result Value Ref Range    WBC 8.5 4.0 - 11.0 K/uL    RBC 4.86 4.00 - 5.20 M/uL    Hemoglobin 14.8 12.0 - 16.0 g/dL    Hematocrit 43.0 36.0 - 48.0 %    MCV 88.5 80.0 - 100.0 fL    MCH 30.5 26.0 - 34.0 pg    MCHC 34.4 31.0 - 36.0 g/dL    RDW 13.9 12.4 - 15.4 %    Platelets 543 772 - 263 K/uL    MPV 8.6 5.0 - 10.5 fL    Neutrophils % 42.2 %    Lymphocytes % 45.0 %    Monocytes % 7.9 %    Eosinophils % 4.2 %    Basophils % 0.7 %    Neutrophils Absolute 3.6 1.7 - 7.7 K/uL    Lymphocytes Absolute 3.8 1.0 - 5.1 K/uL    Monocytes Absolute 0.7 0.0 - 1.3 K/uL    Eosinophils Absolute 0.4 0.0 - 0.6 K/uL    Basophils Absolute 0.1 0.0 - 0.2 K/uL   Troponin   Result Value Ref Range    Troponin <0.01 <0.01 ng/mL   Magnesium   Result Value Ref Range    Magnesium 1.30 (L) 1.80 - 2.40 mg/dL   Phosphorus   Result Value Ref Range    Phosphorus 4.0 2.5 - 4.9 mg/dL       RECENT VITALS:  BP: 116/67, Temp: 98.2 °F (36.8 °C), Heart Rate: 90, Resp: 14, SpO2: 94 %     Procedures     None     ED Course     Nursing Notes, Past Medical Hx, Past Surgical Hx, Social Hx, Allergies, and Family Hx were reviewed. The patient was given the following medications:  Orders Placed This Encounter   Medications    magnesium oxide (MAG-OX) tablet 400 mg    magnesium sulfate 2000 mg in 50 mL IVPB premix    lactated ringers bolus            CONSULTS:  None    MEDICAL DECISION MAKING / ASSESSMENT / PLAN     Briefly, Chayo Tate is a 65 y.o. female who presented to the emergency department with lightheadedness and muscle spasms. On presentation, patient is well-appearing, no acute distress. Patient is afebrile with stable vital signs. Exam reveals well-appearing patient with unremarkable physical exam.  She is found to be hyperglycemic to 346, and hypomagnesemic to 1.3. W will give her a liter of IV fluids as well as 400 of Mag-Ox and 2 g of magnesium IV. Troponin was negative. EKG nonischemic and similar in appearance to prior EKG. Chest x-ray unremarkable. CBC without evidence of anemia or leukocytosis. She was mildly orthostatic, but feeling better with IV fluids. With her age, risk factors, likely warrants further outpatient work-up to rule out cardiac etiology.   Her heart score is 3, so do not think that she needs to stay for inpatient stress test.  May benefit from outpatient Holter monitor, possibly echo, possibly repeat stress test at some point. Patient will be advised to follow-up with primary care as well as cardiology. Also advised to take her diabetic medications as her blood sugar was 346, but without anion gap concerning for DKA. At this point in time, patient is stable for discharge. Patient was given strict return precautions as outlined in the AVS. Patient was agreeable and understanding to this plan of care. Prior to discharge, patient was ambulatory and PO tolerant. The patient was evaluated by myself and the ED Attending Physician, Dr. Hasmukh Márquez All management and disposition plans were discussed and agreed upon. Clinical Impression     1.  Lightheadedness        Disposition     DC    DISCHARGE MEDICATIONS:  Discharge Medication List as of 11/8/2022 11:23 PM          PATIENT REFERRED TO:  [facility] Emergency Department  [address]  [address]  [phone]    As needed, If symptoms worsen    Chely Penaloza MD  [facility] [address]  [phone]          [address]  [address]  [phone]      clinic    JEFFERSON Brizuela Asa - CNP, CNP  Department of Emergency Medicine     JEFFRESON Brizuela Asa - [facility]  11/08/22 7507

## 2022-11-10 ENCOUNTER — OFFICE VISIT (OUTPATIENT)
Dept: FAMILY MEDICINE CLINIC | Age: 62
End: 2022-11-10
Payer: OTHER GOVERNMENT

## 2022-11-10 VITALS
HEART RATE: 91 BPM | TEMPERATURE: 97.2 F | WEIGHT: 205.4 LBS | DIASTOLIC BLOOD PRESSURE: 68 MMHG | HEIGHT: 63 IN | BODY MASS INDEX: 36.39 KG/M2 | OXYGEN SATURATION: 95 % | SYSTOLIC BLOOD PRESSURE: 106 MMHG

## 2022-11-10 DIAGNOSIS — E11.65 UNCONTROLLED TYPE 2 DIABETES MELLITUS WITH HYPERGLYCEMIA (HCC): Primary | ICD-10-CM

## 2022-11-10 DIAGNOSIS — E83.42 HYPOMAGNESEMIA: ICD-10-CM

## 2022-11-10 DIAGNOSIS — R25.2 MUSCLE CRAMPS: ICD-10-CM

## 2022-11-10 DIAGNOSIS — R06.02 SOB (SHORTNESS OF BREATH): ICD-10-CM

## 2022-11-10 LAB — HBA1C MFR BLD: 9.6 %

## 2022-11-10 PROCEDURE — 3046F HEMOGLOBIN A1C LEVEL >9.0%: CPT | Performed by: FAMILY MEDICINE

## 2022-11-10 PROCEDURE — 83036 HEMOGLOBIN GLYCOSYLATED A1C: CPT | Performed by: FAMILY MEDICINE

## 2022-11-10 PROCEDURE — 99214 OFFICE O/P EST MOD 30 MIN: CPT | Performed by: FAMILY MEDICINE

## 2022-11-10 RX ORDER — CYCLOBENZAPRINE HCL 10 MG
10 TABLET ORAL 3 TIMES DAILY PRN
Qty: 21 TABLET | Refills: 0 | Status: SHIPPED | OUTPATIENT
Start: 2022-11-10 | End: 2022-11-20

## 2022-11-10 ASSESSMENT — PATIENT HEALTH QUESTIONNAIRE - PHQ9
1. LITTLE INTEREST OR PLEASURE IN DOING THINGS: 0
2. FEELING DOWN, DEPRESSED OR HOPELESS: 0
SUM OF ALL RESPONSES TO PHQ QUESTIONS 1-9: 0
SUM OF ALL RESPONSES TO PHQ9 QUESTIONS 1 & 2: 0

## 2022-11-10 NOTE — PROGRESS NOTES
cramps  Recently seen in the emergency room for muscle cramps and was given a muscle relaxant and she felt that she is doing much better now. We will send her a prescription for muscle relaxants to keep in hand if symptoms return again. Contributing factors to muscle cramps discussed with her today. Replacement of potassium magnesium diet increased with these minerals plus control of blood sugar discussed with her. 4. Hypomagnesemia  Recommended daily intake of to 50 mg of magnesium and at least 10 mEq of potassium along with good blood pressure control.       REVIEW OF SYSTEMS:  Pertinent positive and negative symptoms noted in HPI        Lab Results   Component Value Date    WBC 8.5 11/08/2022    HGB 14.8 11/08/2022    HCT 43.0 11/08/2022    MCV 88.5 11/08/2022     11/08/2022      Lab Results   Component Value Date    LABA1C 9.6 11/10/2022     Lab Results   Component Value Date    .4 08/17/2017     Lab Results   Component Value Date    TSHFT4 1.25 09/14/2022    TSH 1.57 06/30/2014     Lab Results   Component Value Date    CHOL 163 09/14/2022     Lab Results   Component Value Date    TRIG 369 (H) 09/14/2022     Lab Results   Component Value Date    HDL 40 09/14/2022     Lab Results   Component Value Date    LDLCALC see below 09/14/2022     Lab Results   Component Value Date    LABVLDL see below 09/14/2022     Lab Results   Component Value Date    CHOLHDLRATIO 4.2 12/27/2011      Lab Results   Component Value Date     (L) 11/08/2022    K 4.4 11/08/2022    CL 98 (L) 11/08/2022    CO2 23 11/08/2022    BUN 13 11/08/2022    CREATININE 0.6 11/08/2022    GLUCOSE 346 (H) 11/08/2022    CALCIUM 10.2 11/08/2022    PROT 6.9 09/14/2022    LABALBU 4.5 09/14/2022    BILITOT 0.8 09/14/2022    ALKPHOS 106 09/14/2022    AST 30 09/14/2022    ALT 26 09/14/2022    LABGLOM >60 11/08/2022    GFRAA >60 09/14/2022    AGRATIO 1.9 09/14/2022    GLOB 2.4 08/18/2020           Current Outpatient Medications   Medication Sig Dispense Refill    cyclobenzaprine (FLEXERIL) 10 MG tablet Take 1 tablet by mouth 3 times daily as needed for Muscle spasms 21 tablet 0    Liraglutide (VICTOZA) 18 MG/3ML SOPN SC injection DIAL AND INJECT UNDER THE SKIN 1.8 MG DAILY 9 Adjustable Dose Pre-filled Pen Syringe 5    olmesartan (BENICAR) 20 MG tablet TAKE ONE TABLET BY MOUTH DAILY 90 tablet 1    simvastatin (ZOCOR) 20 MG tablet TAKE ONE TABLET BY MOUTH EVERY NIGHT AT BEDTIME 90 tablet 1    metFORMIN (GLUCOPHAGE) 1000 MG tablet TAKE ONE TABLET BY MOUTH TWICE A DAY WITH MEALS 180 tablet 1    glipiZIDE (GLUCOTROL) 10 MG tablet TAKE ONE TO TWO TABLETS BY MOUTH EVERY DAY BEFORE BREAKFAST AND 2 TABLETS BY MOUTH BEFORE SUPPER 360 tablet 0    meclizine (ANTIVERT) 12.5 MG tablet 1 po tid prn spinning 30 tablet 0    levothyroxine (SYNTHROID) 112 MCG tablet TAKE ONE TABLET BY MOUTH DAILY 90 tablet 3    blood glucose monitor kit and supplies Test bid & as needed for symptoms of irregular blood glucose. 1 kit 0    blood glucose monitor strips Test bid & as needed for symptoms of irregular blood glucose. 100 strip 0    Biotin 5 MG CAPS Take by mouth      vitamin D (CHOLECALCIFEROL) 1000 UNIT TABS tablet Take 1,000 Units by mouth daily       No current facility-administered medications for this visit. Allergies   Allergen Reactions    Lisinopril      Cough/scratchy throat         Past medical, Surgical,Family, Social History Reviewed and Updated in chart today. OBJECTIVE:      Vitals:    11/10/22 1056   BP: 106/68   Pulse: 91   Temp: 97.2 °F (36.2 °C)   SpO2: 95%   Weight: 205 lb 6.4 oz (93.2 kg)   Height: 5' 3\" (1.6 m)         Constitutional: Patient appears well-nourished, not in any distress. HENT:  Head: Normocephalic and atraumatic. Eyes: Conjunctivae and EOM are normal  Right Ear: External ear normal.  Left Ear: External ear normal.   Nose: Nose normal.  Mouth/Throat: Oropharynx is clear and moist.   Neck: Normal range of motion.  Neck supple. Lymphatic: No cervical lymphadenopathy  Cardiovascular: Normal rate, regular rhythm, normal heart sounds and intact distal pulses. Pulmonary/Chest: Effort normal and breath sounds normal, no wheezes or rhonchi. Neurological:Patient is alert, oriented to person, place, and time. No obvious focal neurological deficits  Skin: Skin is warm and moist.  Psychiatric:Patient has a normal mood and affect, behavior is normal. Judgment and thought content normal.    ASSESSMENT AND PLAN    Mareilos Aggarwal was seen today for new patient. Diagnoses and all orders for this visit:    Uncontrolled type 2 diabetes mellitus with hyperglycemia ([address]  -     POCT glycosylated hemoglobin (Hb A1C)    SOB (shortness of breath)  -     Full PFT Study With Bronchodilator; Future  -     Echocardiogram complete; Future    Muscle cramps    Hypomagnesemia    Other orders  -     cyclobenzaprine (FLEXERIL) 10 MG tablet; Take 1 tablet by mouth 3 times daily as needed for Muscle spasms           DISCHARGE MEDICATION LIST        Medication List            Accurate as of November 10, 2022  1:10 PM. If you have any questions, ask your nurse or doctor. START taking these medications      cyclobenzaprine 10 MG tablet  Commonly known as: FLEXERIL  Take 1 tablet by mouth 3 times daily as needed for Muscle spasms  Started by: Luis Mac MD            CONTINUE taking these medications      Biotin 5 MG Caps     blood glucose monitor kit and supplies  Test bid & as needed for symptoms of irregular blood glucose. blood glucose test strips  Test bid & as needed for symptoms of irregular blood glucose.      glipiZIDE 10 MG tablet  Commonly known as: GLUCOTROL  TAKE ONE TO TWO TABLETS BY MOUTH EVERY DAY BEFORE BREAKFAST AND 2 TABLETS BY MOUTH BEFORE SUPPER     levothyroxine 112 MCG tablet  Commonly known as: SYNTHROID  TAKE ONE TABLET BY MOUTH DAILY     meclizine 12.5 MG tablet  Commonly known as: ANTIVERT  1 po tid prn spinning metFORMIN 1000 MG tablet  Commonly known as: GLUCOPHAGE  TAKE ONE TABLET BY MOUTH TWICE A DAY WITH MEALS     olmesartan 20 MG tablet  Commonly known as: BENICAR  TAKE ONE TABLET BY MOUTH DAILY     simvastatin 20 MG tablet  Commonly known as: ZOCOR  TAKE ONE TABLET BY MOUTH EVERY NIGHT AT BEDTIME     Victoza 18 MG/3ML Sopn SC injection  Generic drug: Liraglutide  DIAL AND INJECT UNDER THE SKIN 1.8 MG DAILY     vitamin D 1000 UNIT Tabs tablet  Commonly known as: CHOLECALCIFEROL               Where to Get Your Medications        These medications were sent to [facility] [id number] [facility]  Abhinav French, Niki Álvaro [address]      Phone: [phone]   cyclobenzaprine 10 MG tablet          Return if symptoms worsen or fail to improve. Please refer to diagnosis, orders and patient instructions for further recommendations given. Body mass index is 36.38 kg/m². . Goals of Healthy standard of living discussed. Emphasis given on appropriate diet and routine regular physical activity with cardio and strength training as much as tolerated advised. All patient's questions and concerns were addressed appropriately. All orders, handouts were reviewed in detail with the patient and patient voiced understanding verbally.

## 2022-12-12 ENCOUNTER — OFFICE VISIT (OUTPATIENT)
Dept: FAMILY MEDICINE CLINIC | Age: 62
End: 2022-12-12
Payer: OTHER GOVERNMENT

## 2022-12-12 VITALS
HEIGHT: 63 IN | BODY MASS INDEX: 36.79 KG/M2 | HEART RATE: 80 BPM | SYSTOLIC BLOOD PRESSURE: 130 MMHG | DIASTOLIC BLOOD PRESSURE: 72 MMHG | WEIGHT: 207.6 LBS | OXYGEN SATURATION: 95 % | TEMPERATURE: 97.2 F

## 2022-12-12 DIAGNOSIS — E11.65 UNCONTROLLED TYPE 2 DIABETES MELLITUS WITH HYPERGLYCEMIA (HCC): Primary | ICD-10-CM

## 2022-12-12 PROCEDURE — 3046F HEMOGLOBIN A1C LEVEL >9.0%: CPT | Performed by: FAMILY MEDICINE

## 2022-12-12 PROCEDURE — 99213 OFFICE O/P EST LOW 20 MIN: CPT | Performed by: FAMILY MEDICINE

## 2022-12-12 ASSESSMENT — PATIENT HEALTH QUESTIONNAIRE - PHQ9
SUM OF ALL RESPONSES TO PHQ9 QUESTIONS 1 & 2: 0
2. FEELING DOWN, DEPRESSED OR HOPELESS: 0
SUM OF ALL RESPONSES TO PHQ QUESTIONS 1-9: 0
SUM OF ALL RESPONSES TO PHQ QUESTIONS 1-9: 0
1. LITTLE INTEREST OR PLEASURE IN DOING THINGS: 0
SUM OF ALL RESPONSES TO PHQ QUESTIONS 1-9: 0
SUM OF ALL RESPONSES TO PHQ QUESTIONS 1-9: 0

## 2022-12-12 NOTE — PROGRESS NOTES
Portions of this chart may have been created with voice recognition software. Occasional wrong-word or \"sound-alike\" substitutions may have occurred due to the inherent limitations of voice recognition software. Read the chart carefully and recognize, using context, where these substitutions have occurred        Chief Complaint     Follow-up (1mo)               Stephanie Shabazz is a 65 y.o. female here for follow-up        1. Uncontrolled type 2 diabetes mellitus with hyperglycemia ([address]  Patient started taking her medications regularly as prescribed, however she still states that she might have missed a couple evening doses. Blood sugars are still in the low and upper 200s fasting and postprandial.  Will add Jardiance at this time. Recommended patient to continue monitoring her blood sugars at home. No hypoglycemic episodes no chest pain palpitation shortness of breath or any other significant symptoms. Recommended follow-up in 2 months for recheck of A1c.                 REVIEW OF SYSTEMS:  Pertinent positive and negative symptoms noted in HPI        Lab Results   Component Value Date    WBC 8.5 11/08/2022    HGB 14.8 11/08/2022    HCT 43.0 11/08/2022    MCV 88.5 11/08/2022     11/08/2022      Lab Results   Component Value Date    LABA1C 9.6 11/10/2022     Lab Results   Component Value Date    .4 08/17/2017     Lab Results   Component Value Date    TSHFT4 1.25 09/14/2022    TSH 1.57 06/30/2014     Lab Results   Component Value Date    CHOL 163 09/14/2022     Lab Results   Component Value Date    TRIG 369 (H) 09/14/2022     Lab Results   Component Value Date    HDL 40 09/14/2022     Lab Results   Component Value Date    LDLCALC see below 09/14/2022     Lab Results   Component Value Date    LABVLDL see below 09/14/2022     Lab Results   Component Value Date    CHOLHDLRATIO 4.2 12/27/2011      Lab Results   Component Value Date     (L) 11/08/2022    K 4.4 11/08/2022    CL 98 (L) 11/08/2022    CO2 23 11/08/2022    BUN 13 11/08/2022    CREATININE 0.6 11/08/2022    GLUCOSE 346 (H) 11/08/2022    CALCIUM 10.2 11/08/2022    PROT 6.9 09/14/2022    LABALBU 4.5 09/14/2022    BILITOT 0.8 09/14/2022    ALKPHOS 106 09/14/2022    AST 30 09/14/2022    ALT 26 09/14/2022    LABGLOM >60 11/08/2022    GFRAA >60 09/14/2022    AGRATIO 1.9 09/14/2022    GLOB 2.4 08/18/2020           Current Outpatient Medications   Medication Sig Dispense Refill    empagliflozin (JARDIANCE) 25 MG tablet Take 1 tablet by mouth daily 90 tablet 1    Liraglutide (VICTOZA) 18 MG/3ML SOPN SC injection DIAL AND INJECT UNDER THE SKIN 1.8 MG DAILY 9 Adjustable Dose Pre-filled Pen Syringe 5    olmesartan (BENICAR) 20 MG tablet TAKE ONE TABLET BY MOUTH DAILY 90 tablet 1    simvastatin (ZOCOR) 20 MG tablet TAKE ONE TABLET BY MOUTH EVERY NIGHT AT BEDTIME 90 tablet 1    metFORMIN (GLUCOPHAGE) 1000 MG tablet TAKE ONE TABLET BY MOUTH TWICE A DAY WITH MEALS 180 tablet 1    glipiZIDE (GLUCOTROL) 10 MG tablet TAKE ONE TO TWO TABLETS BY MOUTH EVERY DAY BEFORE BREAKFAST AND 2 TABLETS BY MOUTH BEFORE SUPPER 360 tablet 0    meclizine (ANTIVERT) 12.5 MG tablet 1 po tid prn spinning 30 tablet 0    levothyroxine (SYNTHROID) 112 MCG tablet TAKE ONE TABLET BY MOUTH DAILY 90 tablet 3    blood glucose monitor kit and supplies Test bid & as needed for symptoms of irregular blood glucose. 1 kit 0    blood glucose monitor strips Test bid & as needed for symptoms of irregular blood glucose. 100 strip 0    Biotin 5 MG CAPS Take by mouth      vitamin D (CHOLECALCIFEROL) 1000 UNIT TABS tablet Take 1,000 Units by mouth daily       No current facility-administered medications for this visit. Allergies   Allergen Reactions    Lisinopril      Cough/scratchy throat         Past medical, Surgical,Family, Social History Reviewed and Updated in chart today.           OBJECTIVE:      Vitals:    12/12/22 1102   BP: 130/72   Pulse: 80   Temp: 97.2 °F (36.2 °C)   SpO2: 95%   Weight: 207 lb 9.6 oz (94.2 kg)   Height: 5' 3\" (1.6 m)         Constitutional: Patient appears well-nourished, not in any distress. HENT:  Head: Normocephalic and atraumatic. Eyes: Conjunctivae and EOM are normal  Right Ear: External ear normal.  Left Ear: External ear normal.   Nose: Nose normal.  Mouth/Throat: Oropharynx is clear and moist.   Neck: Normal range of motion. Neck supple. Lymphatic: No cervical lymphadenopathy  Cardiovascular: Normal rate, regular rhythm, normal heart sounds and intact distal pulses. Pulmonary/Chest: Effort normal and breath sounds normal, no wheezes or rhonchi. Neurological:Patient is alert, oriented to person, place, and time. No obvious focal neurological deficits  Skin: Skin is warm and moist.  Psychiatric:Patient has a normal mood and affect, behavior is normal. Judgment and thought content normal.    ASSESSMENT AND PLAN    Adan Alvarez was seen today for follow-up. Diagnoses and all orders for this visit:    Uncontrolled type 2 diabetes mellitus with hyperglycemia ([facility] [address]    Other orders  -     empagliflozin (JARDIANCE) 25 MG tablet; Take 1 tablet by mouth daily           DISCHARGE MEDICATION LIST        Medication List            Accurate as of December 12, 2022 11:21 AM. If you have any questions, ask your nurse or doctor. START taking these medications      empagliflozin 25 MG tablet  Commonly known as: Jardiance  Take 1 tablet by mouth daily  Started by: Konstantin Flynn MD            CONTINUE taking these medications      Biotin 5 MG Caps     blood glucose monitor kit and supplies  Test bid & as needed for symptoms of irregular blood glucose. blood glucose test strips  Test bid & as needed for symptoms of irregular blood glucose.      glipiZIDE 10 MG tablet  Commonly known as: GLUCOTROL  TAKE ONE TO TWO TABLETS BY MOUTH EVERY DAY BEFORE BREAKFAST AND 2 TABLETS BY MOUTH BEFORE SUPPER     levothyroxine 112 MCG tablet  Commonly known as:

## 2023-03-01 DIAGNOSIS — E03.4 HYPOTHYROIDISM DUE TO ACQUIRED ATROPHY OF THYROID: ICD-10-CM

## 2023-03-01 RX ORDER — LEVOTHYROXINE SODIUM 112 UG/1
TABLET ORAL
Qty: 90 TABLET | Refills: 1 | Status: SHIPPED | OUTPATIENT
Start: 2023-03-01

## 2023-06-09 ENCOUNTER — TELEPHONE (OUTPATIENT)
Dept: FAMILY MEDICINE CLINIC | Age: 63
End: 2023-06-09

## 2023-06-09 ENCOUNTER — HOSPITAL ENCOUNTER (OUTPATIENT)
Dept: GENERAL RADIOLOGY | Age: 63
Discharge: HOME | End: 2023-06-09
Payer: OTHER GOVERNMENT

## 2023-06-09 DIAGNOSIS — T14.90XA INJURY: Primary | ICD-10-CM

## 2023-06-09 DIAGNOSIS — T14.90XA INJURY: ICD-10-CM

## 2023-06-09 PROCEDURE — 73130 X-RAY EXAM OF HAND: CPT

## 2023-06-09 NOTE — TELEPHONE ENCOUNTER
Fell yesterday/last night. Left hand is now swollen. Pain isn't bad unless she is typing. Is able to make a slight fist but not a full fist.  Is able to move fingers. Is still working. Is trying to see if she can get an x-ray of her hand. Otherwise she has scrapes. She was trying to cross the street and the street was uneven.

## 2023-06-09 NOTE — TELEPHONE ENCOUNTER
X-ray ordered. Please advise to be seen in urgent care/emergency room if pain and swelling becomes worse.

## 2023-06-12 ENCOUNTER — TELEPHONE (OUTPATIENT)
Dept: FAMILY MEDICINE CLINIC | Age: 63
End: 2023-06-12

## 2023-06-22 DIAGNOSIS — I10 ESSENTIAL HYPERTENSION: ICD-10-CM

## 2023-06-23 RX ORDER — OLMESARTAN MEDOXOMIL 20 MG/1
TABLET ORAL
Qty: 30 TABLET | Refills: 0 | Status: SHIPPED | OUTPATIENT
Start: 2023-06-23 | End: 2023-07-25

## 2023-07-25 DIAGNOSIS — I10 ESSENTIAL HYPERTENSION: ICD-10-CM

## 2023-07-25 RX ORDER — OLMESARTAN MEDOXOMIL 20 MG/1
TABLET ORAL
Qty: 30 TABLET | Refills: 0 | Status: SHIPPED | OUTPATIENT
Start: 2023-07-25

## 2023-08-03 ENCOUNTER — OFFICE VISIT (OUTPATIENT)
Dept: FAMILY MEDICINE CLINIC | Age: 63
End: 2023-08-03
Payer: OTHER GOVERNMENT

## 2023-08-03 VITALS
HEART RATE: 93 BPM | BODY MASS INDEX: 35.47 KG/M2 | OXYGEN SATURATION: 96 % | TEMPERATURE: 97.1 F | DIASTOLIC BLOOD PRESSURE: 82 MMHG | HEIGHT: 63 IN | WEIGHT: 200.2 LBS | SYSTOLIC BLOOD PRESSURE: 120 MMHG

## 2023-08-03 DIAGNOSIS — E03.4 HYPOTHYROIDISM DUE TO ACQUIRED ATROPHY OF THYROID: ICD-10-CM

## 2023-08-03 DIAGNOSIS — E11.65 UNCONTROLLED TYPE 2 DIABETES MELLITUS WITH HYPERGLYCEMIA (HCC): Primary | ICD-10-CM

## 2023-08-03 DIAGNOSIS — I10 ESSENTIAL HYPERTENSION: ICD-10-CM

## 2023-08-03 DIAGNOSIS — E78.00 PURE HYPERCHOLESTEROLEMIA: ICD-10-CM

## 2023-08-03 LAB — HBA1C MFR BLD: 10.6 %

## 2023-08-03 PROCEDURE — 3079F DIAST BP 80-89 MM HG: CPT | Performed by: FAMILY MEDICINE

## 2023-08-03 PROCEDURE — 3074F SYST BP LT 130 MM HG: CPT | Performed by: FAMILY MEDICINE

## 2023-08-03 PROCEDURE — 99214 OFFICE O/P EST MOD 30 MIN: CPT | Performed by: FAMILY MEDICINE

## 2023-08-03 PROCEDURE — 83037 HB GLYCOSYLATED A1C HOME DEV: CPT | Performed by: FAMILY MEDICINE

## 2023-08-03 RX ORDER — GLIPIZIDE 10 MG/1
20 TABLET ORAL
Qty: 360 TABLET | Refills: 0 | Status: SHIPPED | OUTPATIENT
Start: 2023-08-03

## 2023-08-03 RX ORDER — SIMVASTATIN 20 MG
TABLET ORAL
Qty: 90 TABLET | Refills: 1 | Status: SHIPPED | OUTPATIENT
Start: 2023-08-03

## 2023-08-03 SDOH — ECONOMIC STABILITY: HOUSING INSECURITY
IN THE LAST 12 MONTHS, WAS THERE A TIME WHEN YOU DID NOT HAVE A STEADY PLACE TO SLEEP OR SLEPT IN A SHELTER (INCLUDING NOW)?: NO

## 2023-08-03 SDOH — ECONOMIC STABILITY: FOOD INSECURITY: WITHIN THE PAST 12 MONTHS, THE FOOD YOU BOUGHT JUST DIDN'T LAST AND YOU DIDN'T HAVE MONEY TO GET MORE.: NEVER TRUE

## 2023-08-03 SDOH — ECONOMIC STABILITY: INCOME INSECURITY: HOW HARD IS IT FOR YOU TO PAY FOR THE VERY BASICS LIKE FOOD, HOUSING, MEDICAL CARE, AND HEATING?: NOT HARD AT ALL

## 2023-08-03 SDOH — ECONOMIC STABILITY: FOOD INSECURITY: WITHIN THE PAST 12 MONTHS, YOU WORRIED THAT YOUR FOOD WOULD RUN OUT BEFORE YOU GOT MONEY TO BUY MORE.: NEVER TRUE

## 2023-08-03 ASSESSMENT — PATIENT HEALTH QUESTIONNAIRE - PHQ9
SUM OF ALL RESPONSES TO PHQ QUESTIONS 1-9: 0
2. FEELING DOWN, DEPRESSED OR HOPELESS: 0
SUM OF ALL RESPONSES TO PHQ QUESTIONS 1-9: 0
1. LITTLE INTEREST OR PLEASURE IN DOING THINGS: 0
SUM OF ALL RESPONSES TO PHQ QUESTIONS 1-9: 0
SUM OF ALL RESPONSES TO PHQ9 QUESTIONS 1 & 2: 0
SUM OF ALL RESPONSES TO PHQ QUESTIONS 1-9: 0

## 2023-08-03 NOTE — PROGRESS NOTES
Portions of this chart may have been created with voice recognition software. Occasional wrong-word or \"sound-alike\" substitutions may have occurred due to the inherent limitations of voice recognition software. Read the chart carefully and recognize, using context, where these substitutions have occurred        Chief Complaint     Follow-up (F/up meds and refills)               Jocelyn Pugh is a 65 y.o. female here for Follow-up (F/up meds and refills)           1. Uncontrolled type 2 diabetes mellitus with hyperglycemia (HCC)    Diabetic ROS -   medication compliance: Has not been taking her Jardiance as prescribed. She states that she will be picking up the prescription and start taking it now. diabetic diet compliance: compliant most of the time,   home glucose monitoring: is not performed,   further diabetic ROS: no polyuria or polydipsia, no chest pain, dyspnea or TIA's, no numbness, tingling or pain in extremities, no unusual visual symptoms, no   hypoglycemia, no medication side effects noted,       - POCT glycosylated hemoglobin (Hb A1C)  - CBC with Auto Differential; Future  - Comprehensive Metabolic Panel; Future  - Hemoglobin A1C; Future  - Lipid Panel; Future    2. Pure hypercholesterolemia    Cardiovascular risk analysis - The 10-year ASCVD risk score (Queta DIAMOND, et al., 2019) is: 8.6%    Values used to calculate the score:      Age: 65 years      Sex: Female      Is Non- : No      Diabetic: Yes      Tobacco smoker: No      Systolic Blood Pressure: 474 mmHg      Is BP treated: Yes      HDL Cholesterol: 40 mg/dL      Total Cholesterol: 163 mg/dL    LDL goal is under 100   ROS: taking medications as instructed, no medication side effects noted, no TIA's, no chest pain on exertion, no dyspnea on exertion, no swelling of ankles. New concerns: none.   Plan: current treatment plan is effective, no change in therapy  lab results and schedule of future lab studies

## 2023-08-22 DIAGNOSIS — I10 ESSENTIAL HYPERTENSION: ICD-10-CM

## 2023-08-23 RX ORDER — OLMESARTAN MEDOXOMIL 20 MG/1
TABLET ORAL
Qty: 90 TABLET | Refills: 1 | Status: SHIPPED | OUTPATIENT
Start: 2023-08-23

## 2023-09-01 DIAGNOSIS — E03.4 HYPOTHYROIDISM DUE TO ACQUIRED ATROPHY OF THYROID: ICD-10-CM

## 2023-09-01 RX ORDER — LEVOTHYROXINE SODIUM 112 UG/1
TABLET ORAL
Qty: 90 TABLET | Refills: 1 | Status: SHIPPED | OUTPATIENT
Start: 2023-09-01

## 2023-09-11 ENCOUNTER — OFFICE VISIT (OUTPATIENT)
Dept: FAMILY MEDICINE CLINIC | Age: 63
End: 2023-09-11
Payer: OTHER GOVERNMENT

## 2023-09-11 VITALS
OXYGEN SATURATION: 95 % | SYSTOLIC BLOOD PRESSURE: 118 MMHG | BODY MASS INDEX: 36.39 KG/M2 | WEIGHT: 205.4 LBS | HEIGHT: 63 IN | DIASTOLIC BLOOD PRESSURE: 74 MMHG | HEART RATE: 68 BPM | TEMPERATURE: 97.1 F

## 2023-09-11 DIAGNOSIS — S70.361A INSECT BITE OF RIGHT THIGH, INITIAL ENCOUNTER: Primary | ICD-10-CM
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
## 2023-09-11 PROCEDURE — 99213 OFFICE O/P EST LOW 20 MIN: CPT | Performed by: FAMILY MEDICINE

## 2023-09-11 NOTE — PROGRESS NOTES
Portions of this chart may have been created with voice recognition software. Occasional wrong-word or \"sound-alike\" substitutions may have occurred due to the inherent limitations of voice recognition software. Read the chart carefully and recognize, using context, where these substitutions have occurred        Chief Complaint     Rash (On rt leg, rash has been getting a little better. Not painful itchy)               Bart Núñez is a 65 y.o. female here for Rash (On rt leg, rash has been getting a little better. Not painful itchy)           1. Insect bite of right thigh, initial encounter    -Had a small rash on her right thigh on the back which started as a mild red raised spot however progressed to having white pus points on top and then spontaneously resolved with a normal healing. No fever chills no other spreading of the rash and no other lesion anywhere else in the body similarly. No pain no itching. Recommended patient to continue conservative management for now and call back if symptoms do not improve or worsen.                 REVIEW OF SYSTEMS:  Pertinent positive and negative symptoms noted in HPI        Lab Results   Component Value Date    WBC 8.5 11/08/2022    HGB 14.8 11/08/2022    HCT 43.0 11/08/2022    MCV 88.5 11/08/2022     11/08/2022      Lab Results   Component Value Date    LABA1C 10.6 08/03/2023     Lab Results   Component Value Date    .4 08/17/2017     Lab Results   Component Value Date    TSHFT4 1.25 09/14/2022    TSH 1.57 06/30/2014     Lab Results   Component Value Date    CHOL 163 09/14/2022     Lab Results   Component Value Date    TRIG 369 (H) 09/14/2022     Lab Results   Component Value Date    HDL 40 09/14/2022     Lab Results   Component Value Date    LDLCALC see below 09/14/2022     Lab Results   Component Value Date    LABVLDL see below 09/14/2022     Lab Results   Component Value Date    CHOLHDLRATIO 4.2 12/27/2011      Lab Results   Component

## 2023-11-02 ENCOUNTER — OFFICE VISIT (OUTPATIENT)
Dept: FAMILY MEDICINE CLINIC | Age: 63
End: 2023-11-02
Payer: OTHER GOVERNMENT

## 2023-11-02 VITALS
DIASTOLIC BLOOD PRESSURE: 72 MMHG | OXYGEN SATURATION: 96 % | TEMPERATURE: 97 F | HEART RATE: 82 BPM | HEIGHT: 63 IN | WEIGHT: 203.6 LBS | BODY MASS INDEX: 36.07 KG/M2 | SYSTOLIC BLOOD PRESSURE: 120 MMHG

## 2023-11-02 DIAGNOSIS — E78.1 HYPERTRIGLYCERIDEMIA: ICD-10-CM

## 2023-11-02 DIAGNOSIS — E11.65 UNCONTROLLED TYPE 2 DIABETES MELLITUS WITH HYPERGLYCEMIA (HCC): Primary | ICD-10-CM

## 2023-11-02 DIAGNOSIS — I10 ESSENTIAL HYPERTENSION: ICD-10-CM

## 2023-11-02 PROCEDURE — 3046F HEMOGLOBIN A1C LEVEL >9.0%: CPT | Performed by: FAMILY MEDICINE

## 2023-11-02 PROCEDURE — 99214 OFFICE O/P EST MOD 30 MIN: CPT | Performed by: FAMILY MEDICINE

## 2023-11-02 PROCEDURE — 3074F SYST BP LT 130 MM HG: CPT | Performed by: FAMILY MEDICINE

## 2023-11-02 PROCEDURE — 3078F DIAST BP <80 MM HG: CPT | Performed by: FAMILY MEDICINE

## 2023-11-02 RX ORDER — BLOOD-GLUCOSE SENSOR
EACH MISCELLANEOUS
Qty: 2 EACH | Refills: 5 | Status: SHIPPED
Start: 2023-11-02 | End: 2023-11-02 | Stop reason: ALTCHOICE

## 2023-11-02 RX ORDER — ROSUVASTATIN CALCIUM 40 MG/1
40 TABLET, COATED ORAL DAILY
Qty: 90 TABLET | Refills: 1 | Status: SHIPPED | OUTPATIENT
Start: 2023-11-02

## 2023-11-02 RX ORDER — INSULIN GLARGINE 100 [IU]/ML
15 INJECTION, SOLUTION SUBCUTANEOUS NIGHTLY
Qty: 5 ADJUSTABLE DOSE PRE-FILLED PEN SYRINGE | Refills: 0 | Status: SHIPPED | OUTPATIENT
Start: 2023-11-02

## 2023-11-02 ASSESSMENT — PATIENT HEALTH QUESTIONNAIRE - PHQ9
2. FEELING DOWN, DEPRESSED OR HOPELESS: 0
SUM OF ALL RESPONSES TO PHQ9 QUESTIONS 1 & 2: 0
1. LITTLE INTEREST OR PLEASURE IN DOING THINGS: 0
SUM OF ALL RESPONSES TO PHQ QUESTIONS 1-9: 0

## 2023-11-06 ENCOUNTER — TELEPHONE (OUTPATIENT)
Dept: FAMILY MEDICINE CLINIC | Age: 63
End: 2023-11-06

## 2023-11-06 NOTE — TELEPHONE ENCOUNTER
Has she made adjustments to her diet and what she is eating? I am not familiar with her, so to make adjustments, I would recommend that she make appointment. Is she using a standard glucometer? May benefit from dexcom or raj instead.

## 2023-11-06 NOTE — TELEPHONE ENCOUNTER
Pt started taking insulin last week, but her blood sugar levels are still very high and staying in the 300 range. Please call to advise.

## 2023-11-06 NOTE — TELEPHONE ENCOUNTER
Received fax from the pharmacy requesting clarification for freestyle raj. Pharmacy states they received e-script for the [facility] raj 3 and there was script for the freestyle raj 2. Patient picked up script for [address] 3 on 11/3. Advised pharmacist that looks like that script was d/c and raj 2 was put in place. Since patient has picked this up and works similar, will evaluate when due for next fill.

## 2023-11-06 NOTE — TELEPHONE ENCOUNTER
Pt states she has been trying to implement dietary changes and cutting out sugar. States her sugars have been as high as 400, will only go down occasionally. Started using freestyle Zaria Clovis this past week and is taking insulin as prescribed. Pt scheduled with you 11/8 for appt.

## 2023-11-08 ENCOUNTER — OFFICE VISIT (OUTPATIENT)
Dept: FAMILY MEDICINE CLINIC | Age: 63
End: 2023-11-08
Payer: OTHER GOVERNMENT

## 2023-11-08 VITALS
TEMPERATURE: 98.6 F | HEART RATE: 76 BPM | BODY MASS INDEX: 36.86 KG/M2 | SYSTOLIC BLOOD PRESSURE: 120 MMHG | OXYGEN SATURATION: 96 % | HEIGHT: 63 IN | DIASTOLIC BLOOD PRESSURE: 70 MMHG | WEIGHT: 208 LBS

## 2023-11-08 DIAGNOSIS — E11.65 TYPE 2 DIABETES MELLITUS WITH HYPERGLYCEMIA, WITH LONG-TERM CURRENT USE OF INSULIN (HCC): Primary | ICD-10-CM

## 2023-11-08 DIAGNOSIS — Z79.4 TYPE 2 DIABETES MELLITUS WITH HYPERGLYCEMIA, WITH LONG-TERM CURRENT USE OF INSULIN (HCC): Primary | ICD-10-CM

## 2023-11-08 PROCEDURE — 3046F HEMOGLOBIN A1C LEVEL >9.0%: CPT | Performed by: NURSE PRACTITIONER

## 2023-11-08 PROCEDURE — 99213 OFFICE O/P EST LOW 20 MIN: CPT | Performed by: NURSE PRACTITIONER

## 2023-11-08 ASSESSMENT — ENCOUNTER SYMPTOMS
COUGH: 0
BACK PAIN: 0
SINUS PRESSURE: 0
SHORTNESS OF BREATH: 0
CONSTIPATION: 0
SINUS PAIN: 0
WHEEZING: 0
DIARRHEA: 0
ABDOMINAL PAIN: 0
COLOR CHANGE: 0

## 2023-11-08 NOTE — ASSESSMENT & PLAN NOTE
Uncontrolled, continue current medications and continue current treatment plan   Educated regarding the time it may take for her blood sugar readings to come down after beginning insulin. Educated regarding use of CGM.

## 2023-11-08 NOTE — PROGRESS NOTES
Jenifer Knight (:  1960) is a 59 y.o. female,Established patient, here for evaluation of the following chief complaint(s):  Blood Sugar Problem (Elevated readings, recently started using a [employer] Radha, was reading as 'High' from Friday-today, has not been lower than 250)      ASSESSMENT/PLAN:  1. Type 2 diabetes mellitus with hyperglycemia, with long-term current use of insulin ([facility])  Assessment & Plan:  Uncontrolled, continue current medications and continue current treatment plan   Educated regarding the time it may take for her blood sugar readings to come down after beginning insulin. Educated regarding use of CGM. No follow-ups on file. SUBJECTIVE/OBJECTIVE:  HPI  Patient had a CGM placed on Friday and since has had very high blood sugar readings. Sugars since Friday - 400 above  Today - 270s  Taking metformin, glipizide, and insulin. She just started on insulin on Friday. She is worried because she keeps getting high alert alarms on her CGM. Diet is okay - eating home cooked meals (meat, tries not to take a lot of rice/carbs). Current Outpatient Medications   Medication Sig Dispense Refill    insulin glargine (BASAGLAR KWIKPEN) 100 UNIT/ML injection pen Inject 15 Units into the skin nightly 5 Adjustable Dose Pre-filled Pen Syringe 0    rosuvastatin (CRESTOR) 40 MG tablet Take 1 tablet by mouth daily 90 tablet 1    Continuous Blood Gluc Sensor (FREESTYLE RAUL 2 SENSOR) MISC Continuous, check blood sugars as directed.  2 each 5    levothyroxine (SYNTHROID) 112 MCG tablet TAKE ONE TABLET BY MOUTH DAILY 90 tablet 1    olmesartan (BENICAR) 20 MG tablet TAKE 1 TABLET BY MOUTH DAILY *MUST MAKE APPOINTMENT* 90 tablet 1    glipiZIDE (GLUCOTROL) 10 MG tablet Take 2 tablets by mouth 2 times daily (before meals) 360 tablet 0    metFORMIN (GLUCOPHAGE) 1000 MG tablet TAKE ONE TABLET BY MOUTH TWICE A DAY WITH MEALS 180 tablet 1    meclizine (ANTIVERT) 12.5 MG tablet 1 po tid prn spinning 30 tablet

## 2023-12-27 ENCOUNTER — OFFICE VISIT (OUTPATIENT)
Dept: FAMILY MEDICINE CLINIC | Age: 63
End: 2023-12-27
Payer: OTHER GOVERNMENT

## 2023-12-27 VITALS
SYSTOLIC BLOOD PRESSURE: 120 MMHG | BODY MASS INDEX: 35.43 KG/M2 | HEART RATE: 103 BPM | WEIGHT: 200 LBS | DIASTOLIC BLOOD PRESSURE: 72 MMHG | OXYGEN SATURATION: 95 % | TEMPERATURE: 97.8 F

## 2023-12-27 DIAGNOSIS — J01.40 ACUTE NON-RECURRENT PANSINUSITIS: Primary | ICD-10-CM

## 2023-12-27 PROCEDURE — 99213 OFFICE O/P EST LOW 20 MIN: CPT | Performed by: NURSE PRACTITIONER

## 2023-12-27 RX ORDER — AMOXICILLIN AND CLAVULANATE POTASSIUM 875; 125 MG/1; MG/1
1 TABLET, FILM COATED ORAL 2 TIMES DAILY
Qty: 20 TABLET | Refills: 0 | Status: SHIPPED | OUTPATIENT
Start: 2023-12-27 | End: 2024-01-06

## 2023-12-27 NOTE — ASSESSMENT & PLAN NOTE
Ongoing x 10 days  Will treat with Augmentin  Advise COVID testing  Continue OTC medications for symptom relief  Call if no better

## 2024-01-10 NOTE — TELEPHONE ENCOUNTER
PT called in regarding her prescription for Glipizide. She states her pharmacy will not fill the prescription that was sent into the pharmacy on 6/15 where PT is supposed to take 4 tablets daily. She says they can not fill until July. PT would like for us to reach out to them.      Best call back number: [phone]
Spoke with pharmacy who says they didn't get rx of glipizide that was sent over 6/15. Please resend.
Yes

## 2024-01-17 RX ORDER — GLIPIZIDE 10 MG/1
TABLET ORAL
Qty: 360 TABLET | Refills: 0 | Status: SHIPPED | OUTPATIENT
Start: 2024-01-17

## 2024-02-07 RX ORDER — INSULIN GLARGINE 100 [IU]/ML
15 INJECTION, SOLUTION SUBCUTANEOUS NIGHTLY
Qty: 15 ML | Refills: 0 | Status: SHIPPED | OUTPATIENT
Start: 2024-02-07

## 2024-02-24 DIAGNOSIS — I10 ESSENTIAL HYPERTENSION: ICD-10-CM

## 2024-02-26 RX ORDER — OLMESARTAN MEDOXOMIL 20 MG/1
TABLET ORAL
Qty: 90 TABLET | Refills: 1 | Status: SHIPPED | OUTPATIENT
Start: 2024-02-26

## 2024-03-01 DIAGNOSIS — E03.4 HYPOTHYROIDISM DUE TO ACQUIRED ATROPHY OF THYROID: ICD-10-CM

## 2024-03-01 RX ORDER — LEVOTHYROXINE SODIUM 112 UG/1
TABLET ORAL
Qty: 90 TABLET | Refills: 1 | Status: SHIPPED | OUTPATIENT
Start: 2024-03-01

## 2024-03-21 ENCOUNTER — OFFICE VISIT (OUTPATIENT)
Dept: FAMILY MEDICINE CLINIC | Age: 64
End: 2024-03-21
Payer: OTHER GOVERNMENT

## 2024-03-21 VITALS
HEART RATE: 108 BPM | HEIGHT: 63 IN | BODY MASS INDEX: 35.58 KG/M2 | SYSTOLIC BLOOD PRESSURE: 100 MMHG | DIASTOLIC BLOOD PRESSURE: 70 MMHG | WEIGHT: 200.8 LBS | OXYGEN SATURATION: 93 % | TEMPERATURE: 97 F

## 2024-03-21 DIAGNOSIS — E03.4 HYPOTHYROIDISM DUE TO ACQUIRED ATROPHY OF THYROID: ICD-10-CM

## 2024-03-21 DIAGNOSIS — I10 ESSENTIAL HYPERTENSION: ICD-10-CM

## 2024-03-21 DIAGNOSIS — E78.1 HYPERTRIGLYCERIDEMIA: ICD-10-CM

## 2024-03-21 DIAGNOSIS — E78.5 TYPE 2 DIABETES MELLITUS WITH HYPERLIPIDEMIA (HCC): ICD-10-CM

## 2024-03-21 DIAGNOSIS — E11.65 UNCONTROLLED TYPE 2 DIABETES MELLITUS WITH HYPERGLYCEMIA (HCC): ICD-10-CM

## 2024-03-21 DIAGNOSIS — E11.69 TYPE 2 DIABETES MELLITUS WITH HYPERLIPIDEMIA (HCC): ICD-10-CM

## 2024-03-21 DIAGNOSIS — E11.69 TYPE 2 DIABETES MELLITUS WITH HYPERLIPIDEMIA (HCC): Primary | ICD-10-CM

## 2024-03-21 DIAGNOSIS — E78.5 TYPE 2 DIABETES MELLITUS WITH HYPERLIPIDEMIA (HCC): Primary | ICD-10-CM

## 2024-03-21 DIAGNOSIS — M54.16 ACUTE RADICULAR LOW BACK PAIN: ICD-10-CM

## 2024-03-21 LAB
ALBUMIN SERPL-MCNC: 4.2 G/DL (ref 3.4–5)
ALBUMIN/GLOB SERPL: 1.5 {RATIO} (ref 1.1–2.2)
ALP SERPL-CCNC: 138 U/L (ref 40–129)
ALT SERPL-CCNC: 36 U/L (ref 10–40)
ANION GAP SERPL CALCULATED.3IONS-SCNC: 15 MMOL/L (ref 3–16)
AST SERPL-CCNC: 35 U/L (ref 15–37)
BASOPHILS # BLD: 0 K/UL (ref 0–0.2)
BASOPHILS NFR BLD: 0.4 %
BILIRUB SERPL-MCNC: 1.7 MG/DL (ref 0–1)
BUN SERPL-MCNC: 10 MG/DL (ref 7–20)
CALCIUM SERPL-MCNC: 9.6 MG/DL (ref 8.3–10.6)
CHLORIDE SERPL-SCNC: 99 MMOL/L (ref 99–110)
CHOLEST SERPL-MCNC: 138 MG/DL (ref 0–199)
CO2 SERPL-SCNC: 24 MMOL/L (ref 21–32)
CREAT SERPL-MCNC: 0.8 MG/DL (ref 0.6–1.2)
CREAT UR-MCNC: 171.5 MG/DL (ref 28–259)
DEPRECATED RDW RBC AUTO: 13.1 % (ref 12.4–15.4)
EOSINOPHIL # BLD: 0.1 K/UL (ref 0–0.6)
EOSINOPHIL NFR BLD: 0.6 %
GFR SERPLBLD CREATININE-BSD FMLA CKD-EPI: >60 ML/MIN/{1.73_M2}
GLUCOSE SERPL-MCNC: 327 MG/DL (ref 70–99)
HCT VFR BLD AUTO: 40.6 % (ref 36–48)
HDLC SERPL-MCNC: 38 MG/DL (ref 40–60)
HGB BLD-MCNC: 14.5 G/DL (ref 12–16)
LDLC SERPL CALC-MCNC: ABNORMAL MG/DL
LDLC SERPL-MCNC: 38 MG/DL
LYMPHOCYTES # BLD: 3.2 K/UL (ref 1–5.1)
LYMPHOCYTES NFR BLD: 29.6 %
MCH RBC QN AUTO: 30.7 PG (ref 26–34)
MCHC RBC AUTO-ENTMCNC: 35.7 G/DL (ref 31–36)
MCV RBC AUTO: 86.2 FL (ref 80–100)
MICROALBUMIN UR DL<=1MG/L-MCNC: 15.4 MG/DL
MICROALBUMIN/CREAT UR: 89.8 MG/G (ref 0–30)
MONOCYTES # BLD: 0.9 K/UL (ref 0–1.3)
MONOCYTES NFR BLD: 8.4 %
NEUTROPHILS # BLD: 6.6 K/UL (ref 1.7–7.7)
NEUTROPHILS NFR BLD: 61 %
PLATELET # BLD AUTO: 182 K/UL (ref 135–450)
PMV BLD AUTO: 9.2 FL (ref 5–10.5)
POTASSIUM SERPL-SCNC: 4.2 MMOL/L (ref 3.5–5.1)
PROT SERPL-MCNC: 7 G/DL (ref 6.4–8.2)
RBC # BLD AUTO: 4.71 M/UL (ref 4–5.2)
SODIUM SERPL-SCNC: 138 MMOL/L (ref 136–145)
TRIGL SERPL-MCNC: 383 MG/DL (ref 0–150)
VLDLC SERPL CALC-MCNC: ABNORMAL MG/DL
WBC # BLD AUTO: 10.9 K/UL (ref 4–11)

## 2024-03-21 PROCEDURE — 3046F HEMOGLOBIN A1C LEVEL >9.0%: CPT | Performed by: FAMILY MEDICINE

## 2024-03-21 PROCEDURE — 99214 OFFICE O/P EST MOD 30 MIN: CPT | Performed by: FAMILY MEDICINE

## 2024-03-21 PROCEDURE — 3074F SYST BP LT 130 MM HG: CPT | Performed by: FAMILY MEDICINE

## 2024-03-21 PROCEDURE — 3078F DIAST BP <80 MM HG: CPT | Performed by: FAMILY MEDICINE

## 2024-03-21 ASSESSMENT — PATIENT HEALTH QUESTIONNAIRE - PHQ9
SUM OF ALL RESPONSES TO PHQ9 QUESTIONS 1 & 2: 0
SUM OF ALL RESPONSES TO PHQ QUESTIONS 1-9: 0
1. LITTLE INTEREST OR PLEASURE IN DOING THINGS: NOT AT ALL
2. FEELING DOWN, DEPRESSED OR HOPELESS: NOT AT ALL

## 2024-03-22 LAB
EST. AVERAGE GLUCOSE BLD GHB EST-MCNC: 280.5 MG/DL
HBA1C MFR BLD: 11.4 %

## 2024-03-28 NOTE — PROGRESS NOTES
[facility]    Hypothyroidism due to acquired atrophy of thyroid    Essential hypertension         Return in about 3 months (around 6/21/2024).     DISCHARGE MEDICATION LIST        Medication List            Accurate as of March 21, 2024 11:59 PM. If you have any questions, ask your nurse or doctor.                CONTINUE taking these medications      Lissetaglar CurtisikPen 100 UNIT/ML injection pen  Generic drug: insulin glargine  INJECT 15 UNITS INTO THE SKIN NIGHTLY     Biotin 5 MG Caps     blood glucose monitor kit and supplies  Test bid & as needed for symptoms of irregular blood glucose.     blood glucose test strips  Test bid & as needed for symptoms of irregular blood glucose.     FreeStyle Ashley 2 Sensor Misc  Continuous, check blood sugars as directed.     glipiZIDE 10 MG tablet  Commonly known as: GLUCOTROL  TAKE 2 TABLETS BY MOUTH TWICE A DAY (BEFORE MEALS)     levothyroxine 112 MCG tablet  Commonly known as: SYNTHROID  TAKE 1 TABLET BY MOUTH DAILY     meclizine 12.5 MG tablet  Commonly known as: ANTIVERT  1 po tid prn spinning     metFORMIN 1000 MG tablet  Commonly known as: GLUCOPHAGE  TAKE ONE TABLET BY MOUTH TWICE A DAY WITH MEALS     olmesartan 20 MG tablet  Commonly known as: BENICAR  TAKE 1 TABLET BY MOUTH DAILY. MUST MAKE APPOINTMENT     rosuvastatin 40 MG tablet  Commonly known as: CRESTOR  Take 1 tablet by mouth daily     vitamin D 1000 UNIT Tabs tablet  Commonly known as: CHOLECALCIFEROL               Please refer to diagnosis, orders and patient instructions for further recommendations given.    Body mass index is 35.57 kg/m².. Goals of Healthy standard of living discussed. Emphasis given on appropriate diet and routine regular physical activity with cardio and strength training as much as tolerated advised.    All patient's questions and concerns were addressed appropriately.    All orders, handouts were reviewed in detail with the patient and

## 2024-05-07 RX ORDER — ROSUVASTATIN CALCIUM 40 MG/1
40 TABLET, COATED ORAL DAILY
Qty: 90 TABLET | Refills: 1 | Status: SHIPPED | OUTPATIENT
Start: 2024-05-07

## 2024-05-15 RX ORDER — INSULIN GLARGINE 100 [IU]/ML
INJECTION, SOLUTION SUBCUTANEOUS
Qty: 15 ML | Refills: 0 | Status: SHIPPED | OUTPATIENT
Start: 2024-05-15

## 2024-05-15 NOTE — TELEPHONE ENCOUNTER
Requested Prescriptions     Pending Prescriptions Disp Refills    BASAGLAR KWIKPEN 100 UNIT/ML injection pen [Pharmacy Med Name: BASAGLAR 100 UNIT/ML KWIKPEN] 15 mL 0     Sig: INJECT FIFTEEN UNITS UNDER THE SKIN ONCE NIGHTLY     LOV 3.21.24  No FOV scheduled

## 2024-05-22 RX ORDER — GLIPIZIDE 10 MG/1
TABLET ORAL
Qty: 360 TABLET | Refills: 0 | Status: SHIPPED | OUTPATIENT
Start: 2024-05-22

## 2024-08-01 ENCOUNTER — OFFICE VISIT (OUTPATIENT)
Dept: FAMILY MEDICINE CLINIC | Age: 64
End: 2024-08-01
Payer: OTHER GOVERNMENT

## 2024-08-01 VITALS
OXYGEN SATURATION: 96 % | WEIGHT: 201.2 LBS | HEART RATE: 98 BPM | DIASTOLIC BLOOD PRESSURE: 64 MMHG | BODY MASS INDEX: 35.64 KG/M2 | SYSTOLIC BLOOD PRESSURE: 100 MMHG

## 2024-08-01 DIAGNOSIS — E03.4 HYPOTHYROIDISM DUE TO ACQUIRED ATROPHY OF THYROID: ICD-10-CM

## 2024-08-01 DIAGNOSIS — K42.9 UMBILICAL HERNIA WITHOUT OBSTRUCTION AND WITHOUT GANGRENE: ICD-10-CM

## 2024-08-01 DIAGNOSIS — E66.9 OBESITY, CLASS II, BMI 35-39.9: ICD-10-CM

## 2024-08-01 DIAGNOSIS — Z79.4 TYPE 2 DIABETES MELLITUS WITH HYPERGLYCEMIA, WITH LONG-TERM CURRENT USE OF INSULIN (HCC): Primary | ICD-10-CM

## 2024-08-01 DIAGNOSIS — R06.02 SOB (SHORTNESS OF BREATH): ICD-10-CM

## 2024-08-01 DIAGNOSIS — E11.65 TYPE 2 DIABETES MELLITUS WITH HYPERGLYCEMIA, WITH LONG-TERM CURRENT USE OF INSULIN (HCC): Primary | ICD-10-CM

## 2024-08-01 DIAGNOSIS — E78.00 PURE HYPERCHOLESTEROLEMIA: ICD-10-CM

## 2024-08-01 PROCEDURE — 3046F HEMOGLOBIN A1C LEVEL >9.0%: CPT | Performed by: STUDENT IN AN ORGANIZED HEALTH CARE EDUCATION/TRAINING PROGRAM

## 2024-08-01 PROCEDURE — 99214 OFFICE O/P EST MOD 30 MIN: CPT | Performed by: STUDENT IN AN ORGANIZED HEALTH CARE EDUCATION/TRAINING PROGRAM

## 2024-08-01 RX ORDER — ROSUVASTATIN CALCIUM 40 MG/1
40 TABLET, COATED ORAL DAILY
Qty: 90 TABLET | Refills: 1 | Status: SHIPPED | OUTPATIENT
Start: 2024-08-01

## 2024-08-01 RX ORDER — LEVOTHYROXINE SODIUM 112 UG/1
TABLET ORAL
Qty: 90 TABLET | Refills: 1 | Status: SHIPPED | OUTPATIENT
Start: 2024-08-01

## 2024-08-01 RX ORDER — DAPAGLIFLOZIN 5 MG/1
5 TABLET, FILM COATED ORAL EVERY MORNING
Qty: 30 TABLET | Refills: 0 | Status: SHIPPED | OUTPATIENT
Start: 2024-08-01 | End: 2024-08-31

## 2024-08-01 RX ORDER — TIRZEPATIDE 2.5 MG/.5ML
2.5 INJECTION, SOLUTION SUBCUTANEOUS WEEKLY
Qty: 1 EACH | Refills: 0 | Status: SHIPPED | OUTPATIENT
Start: 2024-08-01 | End: 2024-08-31

## 2024-08-01 ASSESSMENT — ENCOUNTER SYMPTOMS: ABDOMINAL PAIN: 0

## 2024-08-01 NOTE — PATIENT INSTRUCTIONS
Guidelines for care with Dr. Lockett and use of Pulsart:     - Please allow 72 hours for response to [facility] Messages. If your concern cannot wait, please schedule an office visit.   - Please allow 72 hours for my comment on blood work or imaging. Occasionally there may be delays.  - If you have specific questions regarding your blood work or imaging, please schedule an office visit.   - If you have a new symptom, please schedule an office visit.   - Medication changes are only made at appointments. If you require a medication change, please schedule an office visit.   - Antibiotics cannot be prescribed without an in office evaluation.   - Controlled substances require IN PERSON office visits every 3 months.  - If you are on a controlled substance, a drug screen will be performed. I will require a negative drug screen.  - If you are late to your appointment, our visit time is limited.  - We can discuss 1-2 problems during follow up appointments, these are 15 minutes long.   - If you have multiple concerns, feel free to schedule multiple appointments. We cannot extend our visit time past 15 minutes regardless of the time the appointment is scheduled, out of consideration for all staff.  - Annual Physical exams are for discussion of Chronic Conditions and general wellness. We may discuss 1-2 new issues if time allows. You can always schedule a follow up appointment.     I would like to provide adequate and thorough attention to your care, which is why I limit discussion of 1-2 problems at a time. You are always welcome to schedule follow-ups with me for any new concerns.    I am here to support you and happy to work with you!    Dr. Lockett

## 2024-08-01 NOTE — PROGRESS NOTES
Lexie Cruz is a 63 y.o.female who presents with   Chief Complaint   Patient presents with    New Patient   Portions of this chart may have been created with voice recognition software. Occasional wrong-word or \"sound-alike\" substitutions may have occurred due to the inherent limitations of voice recognition software. Read the chart carefully and recognize, using context, where these substitutions have occurred      Patient presents to establish care.    Exercise: walking very little   Diet: raisin bran, ham and cheese sandwich,   Substance use: Denies  Stress: Manageable  Sleep: ~8 hours per night  Feels safe at home               Review of Systems   Constitutional:  Negative for fatigue.   Eyes:  Negative for visual disturbance.   Respiratory:  Positive for shortness of breath.    Cardiovascular:  Negative for chest pain.   Gastrointestinal:  Negative for abdominal pain.        Hernia   Skin:  Negative for rash.   Neurological:  Negative for dizziness, light-headedness and headaches.        Past Medical History:   Diagnosis Date    Acute non-recurrent pansinusitis 2023    GERD (gastroesophageal reflux disease)     History of colon polyps 2019    HYPERPLASTIC POLYP     Hyperlipidemia     Hypothyroidism     Obesity, Class II, BMI 35-39.9 2022    Type II or unspecified type diabetes mellitus without mention of complication, not stated as uncontrolled        Past Surgical History:   Procedure Laterality Date     SECTION      x 4    CHOLECYSTECTOMY  10/12/2011    Dr. Lopez performed        Social History     Socioeconomic History    Marital status:      Spouse name: Not on file    Number of children: Not on file    Years of education: Not on file    Highest education level: Not on file   Occupational History    Not on file   Tobacco Use    Smoking status: Former     Current packs/day: 0.00     Types: Cigarettes     Quit date: 9/10/1993     Years since quittin.9    Smokeless

## 2024-08-14 DIAGNOSIS — E78.00 PURE HYPERCHOLESTEROLEMIA: ICD-10-CM

## 2024-08-14 DIAGNOSIS — K42.9 UMBILICAL HERNIA WITHOUT OBSTRUCTION AND WITHOUT GANGRENE: ICD-10-CM

## 2024-08-14 DIAGNOSIS — R06.02 SOB (SHORTNESS OF BREATH): ICD-10-CM

## 2024-08-14 DIAGNOSIS — E66.9 OBESITY, CLASS II, BMI 35-39.9: ICD-10-CM

## 2024-08-14 DIAGNOSIS — Z79.4 TYPE 2 DIABETES MELLITUS WITH HYPERGLYCEMIA, WITH LONG-TERM CURRENT USE OF INSULIN (HCC): ICD-10-CM

## 2024-08-14 DIAGNOSIS — E11.65 TYPE 2 DIABETES MELLITUS WITH HYPERGLYCEMIA, WITH LONG-TERM CURRENT USE OF INSULIN (HCC): ICD-10-CM

## 2024-08-14 LAB
ALBUMIN SERPL-MCNC: 4.2 G/DL (ref 3.4–5)
ALBUMIN/GLOB SERPL: 1.8 {RATIO} (ref 1.1–2.2)
ALP SERPL-CCNC: 111 U/L (ref 40–129)
ALT SERPL-CCNC: 42 U/L (ref 10–40)
ANION GAP SERPL CALCULATED.3IONS-SCNC: 15 MMOL/L (ref 3–16)
AST SERPL-CCNC: 47 U/L (ref 15–37)
BASOPHILS # BLD: 0 K/UL (ref 0–0.2)
BASOPHILS NFR BLD: 0.2 %
BILIRUB SERPL-MCNC: 0.8 MG/DL (ref 0–1)
BUN SERPL-MCNC: 15 MG/DL (ref 7–20)
CALCIUM SERPL-MCNC: 9.3 MG/DL (ref 8.3–10.6)
CHLORIDE SERPL-SCNC: 104 MMOL/L (ref 99–110)
CHOLEST SERPL-MCNC: 106 MG/DL (ref 0–199)
CO2 SERPL-SCNC: 22 MMOL/L (ref 21–32)
CREAT SERPL-MCNC: 1 MG/DL (ref 0.6–1.2)
DEPRECATED RDW RBC AUTO: 14 % (ref 12.4–15.4)
EOSINOPHIL # BLD: 0.1 K/UL (ref 0–0.6)
EOSINOPHIL NFR BLD: 1.7 %
EST. AVERAGE GLUCOSE BLD GHB EST-MCNC: 217.3 MG/DL
GFR SERPLBLD CREATININE-BSD FMLA CKD-EPI: 63 ML/MIN/{1.73_M2}
GLUCOSE SERPL-MCNC: 172 MG/DL (ref 70–99)
HBA1C MFR BLD: 9.2 %
HCT VFR BLD AUTO: 42.1 % (ref 36–48)
HDLC SERPL-MCNC: 29 MG/DL (ref 40–60)
HGB BLD-MCNC: 14.1 G/DL (ref 12–16)
LDLC SERPL CALC-MCNC: 25 MG/DL
LYMPHOCYTES # BLD: 3.5 K/UL (ref 1–5.1)
LYMPHOCYTES NFR BLD: 58.1 %
MCH RBC QN AUTO: 29.7 PG (ref 26–34)
MCHC RBC AUTO-ENTMCNC: 33.6 G/DL (ref 31–36)
MCV RBC AUTO: 88.5 FL (ref 80–100)
MONOCYTES # BLD: 0.5 K/UL (ref 0–1.3)
MONOCYTES NFR BLD: 8.2 %
NEUTROPHILS # BLD: 1.9 K/UL (ref 1.7–7.7)
NEUTROPHILS NFR BLD: 31.8 %
PLATELET # BLD AUTO: 237 K/UL (ref 135–450)
PMV BLD AUTO: 8.6 FL (ref 5–10.5)
POTASSIUM SERPL-SCNC: 3.8 MMOL/L (ref 3.5–5.1)
PROT SERPL-MCNC: 6.6 G/DL (ref 6.4–8.2)
RBC # BLD AUTO: 4.76 M/UL (ref 4–5.2)
SODIUM SERPL-SCNC: 141 MMOL/L (ref 136–145)
TRIGL SERPL-MCNC: 259 MG/DL (ref 0–150)
TSH SERPL DL<=0.005 MIU/L-ACNC: 0.8 UIU/ML (ref 0.27–4.2)
VLDLC SERPL CALC-MCNC: 52 MG/DL
WBC # BLD AUTO: 5.9 K/UL (ref 4–11)

## 2024-08-21 DIAGNOSIS — Z79.4 TYPE 2 DIABETES MELLITUS WITH HYPERGLYCEMIA, WITH LONG-TERM CURRENT USE OF INSULIN (HCC): Primary | ICD-10-CM

## 2024-08-21 DIAGNOSIS — E11.65 TYPE 2 DIABETES MELLITUS WITH HYPERGLYCEMIA, WITH LONG-TERM CURRENT USE OF INSULIN (HCC): Primary | ICD-10-CM

## 2024-08-21 RX ORDER — DULAGLUTIDE 0.75 MG/.5ML
0.75 INJECTION, SOLUTION SUBCUTANEOUS WEEKLY
Qty: 1 ADJUSTABLE DOSE PRE-FILLED PEN SYRINGE | Refills: 0 | Status: SHIPPED | OUTPATIENT
Start: 2024-08-21 | End: 2024-09-20

## 2024-08-23 RX ORDER — INSULIN GLARGINE 100 [IU]/ML
15 INJECTION, SOLUTION SUBCUTANEOUS NIGHTLY
Qty: 15 ML | Refills: 0 | Status: SHIPPED | OUTPATIENT
Start: 2024-08-23 | End: 2024-12-01

## 2024-08-26 RX ORDER — SEMAGLUTIDE 0.68 MG/ML
0.25 INJECTION, SOLUTION SUBCUTANEOUS WEEKLY
Qty: 3 ML | Refills: 0 | Status: SHIPPED | OUTPATIENT
Start: 2024-08-26

## 2024-09-30 RX ORDER — GLIPIZIDE 10 MG/1
10 TABLET ORAL
Qty: 360 TABLET | Refills: 0 | Status: SHIPPED | OUTPATIENT
Start: 2024-09-30 | End: 2024-10-02

## 2024-10-02 ENCOUNTER — TELEPHONE (OUTPATIENT)
Dept: FAMILY MEDICINE CLINIC | Age: 64
End: 2024-10-02

## 2024-10-02 RX ORDER — GLIPIZIDE 10 MG/1
20 TABLET ORAL
Qty: 360 TABLET | Refills: 0 | Status: SHIPPED | OUTPATIENT
Start: 2024-10-02

## 2024-10-02 NOTE — TELEPHONE ENCOUNTER
Pt at pharmacy currently. Script sent Friday was-    glipiZIDE (GLUCOTROL) 10 MG tablet [[id number]]    Order Details  Dose: 10 mg Route: Oral Frequency: 2 TIMES DAILY BEFORE MEALS   Dispense Quantity: 360 tablet Refills: 0          Sig: Take 1 tablet by mouth 2 times daily (before meals)               Usually pt get 2 tablets 2x daily with meals.

## 2024-10-02 NOTE — TELEPHONE ENCOUNTER
Original Rx sent. Would also like to confirm she is not taking Farxiga or Jardiance?  Thank you,   Dr. Lockett

## 2024-10-02 NOTE — TELEPHONE ENCOUNTER
Patient was prescribed Mounjaro at last visit. In order to avoid hypoglycemic (low blood glucose) episodes, her Glipizide was decreased. The decreased dose of Glipizide should be taken only if she picks up Mounjaro. If she is not taking Mounjaro then the original dose of Glipizide should be continued.

## 2024-10-31 RX ORDER — INSULIN GLARGINE 100 [IU]/ML
15 INJECTION, SOLUTION SUBCUTANEOUS NIGHTLY
Qty: 15 ML | Refills: 0 | Status: SHIPPED | OUTPATIENT
Start: 2024-10-31 | End: 2025-02-08

## 2024-11-04 ENCOUNTER — TELEPHONE (OUTPATIENT)
Dept: FAMILY MEDICINE CLINIC | Age: 64
End: 2024-11-04

## 2024-11-04 RX ORDER — INSULIN GLARGINE 100 [IU]/ML
20 INJECTION, SOLUTION SUBCUTANEOUS 2 TIMES DAILY
Qty: 15 ML | Refills: 0 | Status: SHIPPED | OUTPATIENT
Start: 2024-11-04 | End: 2025-02-12

## 2024-11-04 NOTE — TELEPHONE ENCOUNTER
Pt told pharmacy she should be prescribed Insulin glargine (basaglar) 20 units x2 daily, script called in does not match that. Please clarify with pharmacy.

## 2024-12-10 ENCOUNTER — OFFICE VISIT (OUTPATIENT)
Dept: FAMILY MEDICINE CLINIC | Age: 64
End: 2024-12-10
Payer: COMMERCIAL

## 2024-12-10 VITALS
OXYGEN SATURATION: 95 % | DIASTOLIC BLOOD PRESSURE: 78 MMHG | WEIGHT: 195.6 LBS | HEART RATE: 103 BPM | SYSTOLIC BLOOD PRESSURE: 126 MMHG | BODY MASS INDEX: 34.65 KG/M2

## 2024-12-10 DIAGNOSIS — Z12.11 COLON CANCER SCREENING: ICD-10-CM

## 2024-12-10 DIAGNOSIS — M62.838 MUSCLE SPASM: Primary | ICD-10-CM

## 2024-12-10 DIAGNOSIS — Z12.31 ENCOUNTER FOR SCREENING MAMMOGRAM FOR MALIGNANT NEOPLASM OF BREAST: ICD-10-CM

## 2024-12-10 PROCEDURE — G8427 DOCREV CUR MEDS BY ELIG CLIN: HCPCS | Performed by: STUDENT IN AN ORGANIZED HEALTH CARE EDUCATION/TRAINING PROGRAM

## 2024-12-10 PROCEDURE — 3017F COLORECTAL CA SCREEN DOC REV: CPT | Performed by: STUDENT IN AN ORGANIZED HEALTH CARE EDUCATION/TRAINING PROGRAM

## 2024-12-10 PROCEDURE — 99213 OFFICE O/P EST LOW 20 MIN: CPT | Performed by: STUDENT IN AN ORGANIZED HEALTH CARE EDUCATION/TRAINING PROGRAM

## 2024-12-10 PROCEDURE — 1036F TOBACCO NON-USER: CPT | Performed by: STUDENT IN AN ORGANIZED HEALTH CARE EDUCATION/TRAINING PROGRAM

## 2024-12-10 PROCEDURE — G8484 FLU IMMUNIZE NO ADMIN: HCPCS | Performed by: STUDENT IN AN ORGANIZED HEALTH CARE EDUCATION/TRAINING PROGRAM

## 2024-12-10 PROCEDURE — G8417 CALC BMI ABV UP PARAM F/U: HCPCS | Performed by: STUDENT IN AN ORGANIZED HEALTH CARE EDUCATION/TRAINING PROGRAM

## 2024-12-10 RX ORDER — METHOCARBAMOL 750 MG/1
750 TABLET, FILM COATED ORAL 4 TIMES DAILY
Qty: 40 TABLET | Refills: 0 | Status: SHIPPED | OUTPATIENT
Start: 2024-12-10 | End: 2024-12-20

## 2024-12-10 NOTE — PROGRESS NOTES
Lexie Cruz is a 64 y.o.female who presents with   Chief Complaint   Patient presents with    Neck Pain     Pain in neck that started on Friday- possible swelling on left side     Patient presents acutely for pain when turning her neck. Pain is located on the left side and pain occurs when attempting to move the neck in any direction. She has limited ROM. Pain additionally involves the back of her head and scalp. Reports tight squeezing sensation. While uncomfortable pain is not very severe.         Review of Systems   Constitutional:  Negative for fatigue.   Eyes:  Negative for visual disturbance.   Respiratory:  Negative for shortness of breath.    Cardiovascular:  Negative for chest pain.   Gastrointestinal:  Negative for abdominal pain.   Musculoskeletal:  Positive for neck pain.   Skin:  Negative for rash.   Neurological:  Negative for dizziness, light-headedness and headaches.        Past Medical History:   Diagnosis Date    Acute non-recurrent pansinusitis 2023    GERD (gastroesophageal reflux disease)     History of colon polyps 2019    HYPERPLASTIC POLYP 2013    Hyperlipidemia     Hypothyroidism     Obesity, Class II, BMI 35-39.9 2022    Type II or unspecified type diabetes mellitus without mention of complication, not stated as uncontrolled        Past Surgical History:   Procedure Laterality Date     SECTION      x 4    CHOLECYSTECTOMY  10/12/2011    Dr. Lopez performed        Social History     Socioeconomic History    Marital status:      Spouse name: Not on file    Number of children: Not on file    Years of education: Not on file    Highest education level: Not on file   Occupational History    Not on file   Tobacco Use    Smoking status: Former     Current packs/day: 0.00     Types: Cigarettes     Quit date: 9/10/1993     Years since quittin.2    Smokeless tobacco: Former    Tobacco comments:     will not start    Vaping Use    Vaping status: Never Used

## 2024-12-17 ASSESSMENT — ENCOUNTER SYMPTOMS
SHORTNESS OF BREATH: 0
ABDOMINAL PAIN: 0

## 2024-12-30 RX ORDER — GLIPIZIDE 10 MG/1
TABLET ORAL
Qty: 360 TABLET | Refills: 0 | Status: SHIPPED | OUTPATIENT
Start: 2024-12-30

## 2025-01-17 ENCOUNTER — OFFICE VISIT (OUTPATIENT)
Dept: FAMILY MEDICINE CLINIC | Age: 65
End: 2025-01-17
Payer: OTHER GOVERNMENT

## 2025-01-17 VITALS
BODY MASS INDEX: 34.05 KG/M2 | DIASTOLIC BLOOD PRESSURE: 68 MMHG | SYSTOLIC BLOOD PRESSURE: 112 MMHG | HEART RATE: 105 BPM | WEIGHT: 192.2 LBS | OXYGEN SATURATION: 94 % | HEIGHT: 63 IN

## 2025-01-17 DIAGNOSIS — H10.31 ACUTE BACTERIAL CONJUNCTIVITIS OF RIGHT EYE: ICD-10-CM

## 2025-01-17 DIAGNOSIS — J02.9 SORE THROAT: ICD-10-CM

## 2025-01-17 DIAGNOSIS — J01.40 ACUTE NON-RECURRENT PANSINUSITIS: Primary | ICD-10-CM

## 2025-01-17 LAB — S PYO AG THROAT QL: NORMAL

## 2025-01-17 PROCEDURE — 87880 STREP A ASSAY W/OPTIC: CPT | Performed by: NURSE PRACTITIONER

## 2025-01-17 PROCEDURE — 99213 OFFICE O/P EST LOW 20 MIN: CPT | Performed by: NURSE PRACTITIONER

## 2025-01-17 RX ORDER — ERYTHROMYCIN 5 MG/G
OINTMENT OPHTHALMIC
Qty: 3.5 G | Refills: 0 | Status: SHIPPED | OUTPATIENT
Start: 2025-01-17

## 2025-01-17 SDOH — ECONOMIC STABILITY: FOOD INSECURITY: WITHIN THE PAST 12 MONTHS, YOU WORRIED THAT YOUR FOOD WOULD RUN OUT BEFORE YOU GOT MONEY TO BUY MORE.: NEVER TRUE

## 2025-01-17 SDOH — ECONOMIC STABILITY: FOOD INSECURITY: WITHIN THE PAST 12 MONTHS, THE FOOD YOU BOUGHT JUST DIDN'T LAST AND YOU DIDN'T HAVE MONEY TO GET MORE.: NEVER TRUE

## 2025-01-17 ASSESSMENT — PATIENT HEALTH QUESTIONNAIRE - PHQ9
SUM OF ALL RESPONSES TO PHQ QUESTIONS 1-9: 0
2. FEELING DOWN, DEPRESSED OR HOPELESS: NOT AT ALL
SUM OF ALL RESPONSES TO PHQ QUESTIONS 1-9: 0
SUM OF ALL RESPONSES TO PHQ QUESTIONS 1-9: 0
1. LITTLE INTEREST OR PLEASURE IN DOING THINGS: NOT AT ALL
SUM OF ALL RESPONSES TO PHQ QUESTIONS 1-9: 0
SUM OF ALL RESPONSES TO PHQ9 QUESTIONS 1 & 2: 0

## 2025-01-17 ASSESSMENT — ENCOUNTER SYMPTOMS
SINUS PRESSURE: 1
DIARRHEA: 0
SHORTNESS OF BREATH: 0
BACK PAIN: 0
EYE REDNESS: 1
EYE DISCHARGE: 1
EYE ITCHING: 0
EYE PAIN: 0
WHEEZING: 0
CONSTIPATION: 0
SINUS PAIN: 0
RHINORRHEA: 1
COUGH: 0
COLOR CHANGE: 0
SORE THROAT: 1
ABDOMINAL PAIN: 0

## 2025-01-17 NOTE — ASSESSMENT & PLAN NOTE
Acute condition, new, will start Augmentin.  Continue OTC medications for symptom relief if needed.

## 2025-01-17 NOTE — PROGRESS NOTES
back pain and myalgias.   Skin:  Negative for color change, pallor and rash.   Neurological:  Positive for headaches. Negative for dizziness, syncope, weakness and light-headedness.   Psychiatric/Behavioral:  Negative for behavioral problems, confusion and sleep disturbance. The patient is not nervous/anxious.         Objective   Vitals:    01/17/25 1303   BP: 112/68   Pulse: (!) 105   SpO2: 94%   Weight: 87.2 kg (192 lb 3.2 oz)   Height: 1.6 m (5' 3\")       Physical Exam  Constitutional:       Appearance: She is well-developed.   HENT:      Head: Normocephalic and atraumatic.      Right Ear: Tympanic membrane normal.      Left Ear: Tympanic membrane normal.      Nose: Congestion and rhinorrhea present.      Mouth/Throat:      Pharynx: Postnasal drip present. No pharyngeal swelling or oropharyngeal exudate.   Eyes:      Conjunctiva/sclera:      Right eye: Right conjunctiva is injected. Exudate present. No hemorrhage.     Left eye: Left conjunctiva is not injected. No exudate or hemorrhage.     Pupils: Pupils are equal, round, and reactive to light.   Neck:      Thyroid: No thyromegaly.      Vascular: No JVD.   Cardiovascular:      Rate and Rhythm: Normal rate and regular rhythm.      Heart sounds: Normal heart sounds.   Pulmonary:      Effort: Pulmonary effort is normal. No respiratory distress.      Breath sounds: Normal breath sounds. No wheezing.   Musculoskeletal:         General: Normal range of motion.      Cervical back: Normal range of motion and neck supple.   Skin:     General: Skin is warm and dry.      Capillary Refill: Capillary refill takes less than 2 seconds.   Neurological:      Mental Status: She is alert and oriented to person, place, and time.   Psychiatric:         Behavior: Behavior normal.                  An electronic signature was used to authenticate this note.    --Maria Victoria Baum, APRN - CNP

## 2025-02-19 DIAGNOSIS — E03.4 HYPOTHYROIDISM DUE TO ACQUIRED ATROPHY OF THYROID: ICD-10-CM

## 2025-02-19 RX ORDER — LEVOTHYROXINE SODIUM 112 UG/1
TABLET ORAL
Qty: 90 TABLET | Refills: 1 | Status: SHIPPED | OUTPATIENT
Start: 2025-02-19

## 2025-03-11 DIAGNOSIS — E11.65 TYPE 2 DIABETES MELLITUS WITH HYPERGLYCEMIA, WITH LONG-TERM CURRENT USE OF INSULIN (HCC): Primary | ICD-10-CM

## 2025-03-11 DIAGNOSIS — Z79.4 TYPE 2 DIABETES MELLITUS WITH HYPERGLYCEMIA, WITH LONG-TERM CURRENT USE OF INSULIN (HCC): Primary | ICD-10-CM

## 2025-03-12 RX ORDER — INSULIN GLARGINE 100 [IU]/ML
INJECTION, SOLUTION SUBCUTANEOUS
Qty: 15 ML | Refills: 0 | Status: SHIPPED | OUTPATIENT
Start: 2025-03-12

## 2025-04-01 ENCOUNTER — HOSPITAL ENCOUNTER (OUTPATIENT)
Dept: MAMMOGRAPHY | Age: 65
Discharge: HOME | End: 2025-04-01
Attending: STUDENT IN AN ORGANIZED HEALTH CARE EDUCATION/TRAINING PROGRAM
Payer: OTHER GOVERNMENT

## 2025-04-01 ENCOUNTER — RESULTS FOLLOW-UP (OUTPATIENT)
Dept: FAMILY MEDICINE CLINIC | Age: 65
End: 2025-04-01

## 2025-04-01 VITALS — BODY MASS INDEX: 33.66 KG/M2 | HEIGHT: 63 IN | WEIGHT: 190 LBS

## 2025-04-01 DIAGNOSIS — Z12.31 ENCOUNTER FOR SCREENING MAMMOGRAM FOR MALIGNANT NEOPLASM OF BREAST: ICD-10-CM

## 2025-04-01 PROCEDURE — 77067 SCR MAMMO BI INCL CAD: CPT

## 2025-04-02 RX ORDER — GLIPIZIDE 10 MG/1
TABLET ORAL
Qty: 360 TABLET | Refills: 0 | Status: SHIPPED | OUTPATIENT
Start: 2025-04-02

## 2025-04-21 ENCOUNTER — OFFICE VISIT (OUTPATIENT)
Dept: FAMILY MEDICINE CLINIC | Age: 65
End: 2025-04-21
Payer: OTHER GOVERNMENT

## 2025-04-21 VITALS
HEART RATE: 92 BPM | OXYGEN SATURATION: 97 % | SYSTOLIC BLOOD PRESSURE: 112 MMHG | DIASTOLIC BLOOD PRESSURE: 78 MMHG | BODY MASS INDEX: 33.23 KG/M2 | WEIGHT: 187.6 LBS

## 2025-04-21 DIAGNOSIS — Z79.4 TYPE 2 DIABETES MELLITUS WITH HYPERGLYCEMIA, WITH LONG-TERM CURRENT USE OF INSULIN (HCC): Primary | ICD-10-CM

## 2025-04-21 DIAGNOSIS — E11.65 TYPE 2 DIABETES MELLITUS WITH HYPERGLYCEMIA, WITH LONG-TERM CURRENT USE OF INSULIN (HCC): Primary | ICD-10-CM

## 2025-04-21 LAB — HBA1C MFR BLD: 11 %

## 2025-04-21 PROCEDURE — 3046F HEMOGLOBIN A1C LEVEL >9.0%: CPT | Performed by: STUDENT IN AN ORGANIZED HEALTH CARE EDUCATION/TRAINING PROGRAM

## 2025-04-21 PROCEDURE — 83036 HEMOGLOBIN GLYCOSYLATED A1C: CPT | Performed by: STUDENT IN AN ORGANIZED HEALTH CARE EDUCATION/TRAINING PROGRAM

## 2025-04-21 PROCEDURE — 99214 OFFICE O/P EST MOD 30 MIN: CPT | Performed by: STUDENT IN AN ORGANIZED HEALTH CARE EDUCATION/TRAINING PROGRAM

## 2025-04-21 RX ORDER — INSULIN GLARGINE 100 [IU]/ML
20 INJECTION, SOLUTION SUBCUTANEOUS NIGHTLY
Qty: 6 ML | Refills: 1 | Status: SHIPPED | OUTPATIENT
Start: 2025-04-21 | End: 2025-06-20

## 2025-04-21 RX ORDER — AVOBENZONE, HOMOSALATE, OCTISALATE, OCTOCRYLENE 30; 40; 45; 26 MG/ML; MG/ML; MG/ML; MG/ML
1 CREAM TOPICAL DAILY
Qty: 100 EACH | Refills: 5 | Status: SHIPPED | OUTPATIENT
Start: 2025-04-21

## 2025-04-21 RX ORDER — BLOOD-GLUCOSE METER
1 EACH MISCELLANEOUS DAILY
Qty: 1 KIT | Refills: 0 | Status: SHIPPED | OUTPATIENT
Start: 2025-04-21

## 2025-04-21 RX ORDER — GLUCOSAMINE HCL/CHONDROITIN SU 500-400 MG
CAPSULE ORAL
Qty: 300 STRIP | Refills: 0 | Status: SHIPPED | OUTPATIENT
Start: 2025-04-21

## 2025-04-21 RX ORDER — CLOBETASOL PROPIONATE 0.5 MG/G
OINTMENT TOPICAL
Qty: 60 G | Refills: 0 | Status: SHIPPED | OUTPATIENT
Start: 2025-04-21

## 2025-04-21 NOTE — PROGRESS NOTES
Lexie Cruz is a 64 y.o.female who presents with   Chief Complaint   Patient presents with    Dizziness    Poison Ivy     Started Thursday- has it on her face,arms, and legs     History of Present Illness  The patient presents for evaluation of poison ivy, diabetes mellitus, and dizziness.    A rash has been present for the past week, initially not recognized as poison ivy. The rash is located on the face, arms, and lower extremities. The onset occurred after clearing out a creek bed at her mother's house. No swelling has been reported. Calamine lotion has been applied as a treatment.    Diabetes mellitus is managed with metformin and Basaglar 15 units. Blood glucose levels are not currently monitored due to the lack of a monitor. She previously used FreeStyle but has not renewed it. There is no increase in urination or decrease in water intake, but persistent thirst is noted. She has not used Mounjaro, Trulicity, Ozempic, or Farxiga due to cost, and recalls one medication causing a urinary tract infection.    Frequent dizziness has been experienced, occurring without head movement. These episodes began before the onset of the poison ivy rash.        Review of Systems   All other systems reviewed and are negative.       Past Medical History:   Diagnosis Date    Acute non-recurrent pansinusitis 2023    GERD (gastroesophageal reflux disease)     History of colon polyps 2019    HYPERPLASTIC POLYP     Hyperlipidemia     Hypothyroidism     Obesity, Class II, BMI 35-39.9 2022    Type II or unspecified type diabetes mellitus without mention of complication, not stated as uncontrolled        Past Surgical History:   Procedure Laterality Date     SECTION      x 4    CHOLECYSTECTOMY  10/12/2011    Dr. Lopez performed        Social History     Socioeconomic History    Marital status:      Spouse name: Not on file    Number of children: Not on file    Years of education: Not on file

## 2025-04-22 ENCOUNTER — TELEPHONE (OUTPATIENT)
Dept: FAMILY MEDICINE CLINIC | Age: 65
End: 2025-04-22

## 2025-04-30 ENCOUNTER — TELEPHONE (OUTPATIENT)
Dept: FAMILY MEDICINE CLINIC | Age: 65
End: 2025-04-30

## 2025-04-30 NOTE — TELEPHONE ENCOUNTER
Pt will be going out of the country tomorrow and states she may need a letter stating the medications she is taking is okay to take out of the country. She heard this from her sister and wanted to know if this is accurate. If she does not answer she said it is okay to LVM .Please advise.     [phone] (home)

## 2025-04-30 NOTE — TELEPHONE ENCOUNTER
Left patient a message letting her know that the letter is written and asked her to call us back to let us know the best way to get it to her.

## 2025-05-29 DIAGNOSIS — E11.65 TYPE 2 DIABETES MELLITUS WITH HYPERGLYCEMIA, WITH LONG-TERM CURRENT USE OF INSULIN (HCC): ICD-10-CM

## 2025-05-29 DIAGNOSIS — Z79.4 TYPE 2 DIABETES MELLITUS WITH HYPERGLYCEMIA, WITH LONG-TERM CURRENT USE OF INSULIN (HCC): ICD-10-CM

## 2025-05-29 RX ORDER — INSULIN GLARGINE-YFGN 100 [IU]/ML
INJECTION, SOLUTION SUBCUTANEOUS
Qty: 6 ML | Refills: 1 | Status: SHIPPED | OUTPATIENT
Start: 2025-05-29

## 2025-06-30 RX ORDER — GLIPIZIDE 10 MG/1
TABLET ORAL
Qty: 360 TABLET | Refills: 0 | Status: SHIPPED | OUTPATIENT
Start: 2025-06-30

## 2025-08-06 DIAGNOSIS — E11.65 TYPE 2 DIABETES MELLITUS WITH HYPERGLYCEMIA, WITH LONG-TERM CURRENT USE OF INSULIN (HCC): ICD-10-CM

## 2025-08-06 DIAGNOSIS — Z79.4 TYPE 2 DIABETES MELLITUS WITH HYPERGLYCEMIA, WITH LONG-TERM CURRENT USE OF INSULIN (HCC): ICD-10-CM

## 2025-08-06 RX ORDER — INSULIN GLARGINE-YFGN 100 [IU]/ML
INJECTION, SOLUTION SUBCUTANEOUS
Qty: 6 ML | Refills: 1 | Status: SHIPPED | OUTPATIENT
Start: 2025-08-06

## 2025-08-15 DIAGNOSIS — E03.4 HYPOTHYROIDISM DUE TO ACQUIRED ATROPHY OF THYROID: ICD-10-CM

## 2025-08-15 RX ORDER — LEVOTHYROXINE SODIUM 112 UG/1
112 TABLET ORAL DAILY
Qty: 90 TABLET | Refills: 1 | Status: SHIPPED | OUTPATIENT
Start: 2025-08-15